# Patient Record
Sex: MALE | Race: WHITE | NOT HISPANIC OR LATINO | Employment: OTHER | URBAN - METROPOLITAN AREA
[De-identification: names, ages, dates, MRNs, and addresses within clinical notes are randomized per-mention and may not be internally consistent; named-entity substitution may affect disease eponyms.]

---

## 2019-03-24 ENCOUNTER — HOSPITAL ENCOUNTER (OUTPATIENT)
Dept: RADIOLOGY | Facility: MEDICAL CENTER | Age: 64
End: 2019-03-24

## 2019-03-24 ENCOUNTER — HOSPITAL ENCOUNTER (INPATIENT)
Facility: MEDICAL CENTER | Age: 64
LOS: 2 days | DRG: 247 | End: 2019-03-26
Attending: EMERGENCY MEDICINE | Admitting: INTERNAL MEDICINE
Payer: COMMERCIAL

## 2019-03-24 DIAGNOSIS — I21.4 NSTEMI (NON-ST ELEVATED MYOCARDIAL INFARCTION) (HCC): ICD-10-CM

## 2019-03-24 DIAGNOSIS — D72.823 LEUKEMOID REACTION: ICD-10-CM

## 2019-03-24 DIAGNOSIS — E87.6 HYPOKALEMIA: ICD-10-CM

## 2019-03-24 LAB
ALBUMIN SERPL BCP-MCNC: 4.1 G/DL (ref 3.2–4.9)
ALBUMIN/GLOB SERPL: 1.4 G/DL
ALP SERPL-CCNC: 51 U/L (ref 30–99)
ALT SERPL-CCNC: 16 U/L (ref 2–50)
ANION GAP SERPL CALC-SCNC: 7 MMOL/L (ref 0–11.9)
APTT PPP: 29.6 SEC (ref 24.7–36)
AST SERPL-CCNC: 23 U/L (ref 12–45)
BILIRUB SERPL-MCNC: 0.4 MG/DL (ref 0.1–1.5)
BUN SERPL-MCNC: 13 MG/DL (ref 8–22)
CALCIUM SERPL-MCNC: 9.1 MG/DL (ref 8.5–10.5)
CHLORIDE SERPL-SCNC: 110 MMOL/L (ref 96–112)
CO2 SERPL-SCNC: 24 MMOL/L (ref 20–33)
CREAT SERPL-MCNC: 0.85 MG/DL (ref 0.5–1.4)
EKG IMPRESSION: NORMAL
GLOBULIN SER CALC-MCNC: 3 G/DL (ref 1.9–3.5)
GLUCOSE SERPL-MCNC: 92 MG/DL (ref 65–99)
INR PPP: 1.07 (ref 0.87–1.13)
POTASSIUM SERPL-SCNC: 3.8 MMOL/L (ref 3.6–5.5)
PROT SERPL-MCNC: 7.1 G/DL (ref 6–8.2)
PROTHROMBIN TIME: 14 SEC (ref 12–14.6)
SODIUM SERPL-SCNC: 141 MMOL/L (ref 135–145)
TROPONIN I SERPL-MCNC: 4.17 NG/ML (ref 0–0.04)

## 2019-03-24 PROCEDURE — 85610 PROTHROMBIN TIME: CPT

## 2019-03-24 PROCEDURE — 99223 1ST HOSP IP/OBS HIGH 75: CPT | Performed by: INTERNAL MEDICINE

## 2019-03-24 PROCEDURE — 96375 TX/PRO/DX INJ NEW DRUG ADDON: CPT

## 2019-03-24 PROCEDURE — 93005 ELECTROCARDIOGRAM TRACING: CPT | Performed by: EMERGENCY MEDICINE

## 2019-03-24 PROCEDURE — 96366 THER/PROPH/DIAG IV INF ADDON: CPT

## 2019-03-24 PROCEDURE — 96365 THER/PROPH/DIAG IV INF INIT: CPT

## 2019-03-24 PROCEDURE — 85730 THROMBOPLASTIN TIME PARTIAL: CPT

## 2019-03-24 PROCEDURE — A9270 NON-COVERED ITEM OR SERVICE: HCPCS | Performed by: INTERNAL MEDICINE

## 2019-03-24 PROCEDURE — 84484 ASSAY OF TROPONIN QUANT: CPT

## 2019-03-24 PROCEDURE — 700102 HCHG RX REV CODE 250 W/ 637 OVERRIDE(OP): Performed by: INTERNAL MEDICINE

## 2019-03-24 PROCEDURE — 770020 HCHG ROOM/CARE - TELE (206)

## 2019-03-24 PROCEDURE — 700111 HCHG RX REV CODE 636 W/ 250 OVERRIDE (IP): Performed by: EMERGENCY MEDICINE

## 2019-03-24 PROCEDURE — 80053 COMPREHEN METABOLIC PANEL: CPT

## 2019-03-24 PROCEDURE — 99285 EMERGENCY DEPT VISIT HI MDM: CPT

## 2019-03-24 PROCEDURE — 770006 HCHG ROOM/CARE - MED/SURG/GYN SEMI*

## 2019-03-24 RX ORDER — ACETAMINOPHEN 325 MG/1
650 TABLET ORAL EVERY 6 HOURS PRN
Status: DISCONTINUED | OUTPATIENT
Start: 2019-03-24 | End: 2019-03-26 | Stop reason: HOSPADM

## 2019-03-24 RX ORDER — POLYETHYLENE GLYCOL 3350 17 G/17G
1 POWDER, FOR SOLUTION ORAL
Status: DISCONTINUED | OUTPATIENT
Start: 2019-03-24 | End: 2019-03-26 | Stop reason: HOSPADM

## 2019-03-24 RX ORDER — BISACODYL 10 MG
10 SUPPOSITORY, RECTAL RECTAL
Status: DISCONTINUED | OUTPATIENT
Start: 2019-03-24 | End: 2019-03-26 | Stop reason: HOSPADM

## 2019-03-24 RX ORDER — MORPHINE SULFATE 4 MG/ML
2-4 INJECTION, SOLUTION INTRAMUSCULAR; INTRAVENOUS
Status: DISCONTINUED | OUTPATIENT
Start: 2019-03-24 | End: 2019-03-26 | Stop reason: HOSPADM

## 2019-03-24 RX ORDER — HEPARIN SODIUM 1000 [USP'U]/ML
6000 INJECTION, SOLUTION INTRAVENOUS; SUBCUTANEOUS ONCE
Status: COMPLETED | OUTPATIENT
Start: 2019-03-24 | End: 2019-03-24

## 2019-03-24 RX ORDER — BUPIVACAINE HYDROCHLORIDE AND EPINEPHRINE 5; 5 MG/ML; UG/ML
10 INJECTION, SOLUTION EPIDURAL; INTRACAUDAL; PERINEURAL ONCE
Status: DISCONTINUED | OUTPATIENT
Start: 2019-03-24 | End: 2019-03-24 | Stop reason: CLARIF

## 2019-03-24 RX ORDER — HEPARIN SODIUM 1000 [USP'U]/ML
3200 INJECTION, SOLUTION INTRAVENOUS; SUBCUTANEOUS PRN
Status: DISCONTINUED | OUTPATIENT
Start: 2019-03-24 | End: 2019-03-25

## 2019-03-24 RX ORDER — AMOXICILLIN 250 MG
2 CAPSULE ORAL 2 TIMES DAILY
Status: DISCONTINUED | OUTPATIENT
Start: 2019-03-24 | End: 2019-03-26 | Stop reason: HOSPADM

## 2019-03-24 RX ORDER — ONDANSETRON 4 MG/1
4 TABLET, ORALLY DISINTEGRATING ORAL EVERY 4 HOURS PRN
Status: DISCONTINUED | OUTPATIENT
Start: 2019-03-24 | End: 2019-03-26 | Stop reason: HOSPADM

## 2019-03-24 RX ORDER — CEFAZOLIN SODIUM 2 G/100ML
2 INJECTION, SOLUTION INTRAVENOUS ONCE
Status: DISCONTINUED | OUTPATIENT
Start: 2019-03-24 | End: 2019-03-24 | Stop reason: CLARIF

## 2019-03-24 RX ORDER — CEFAZOLIN SODIUM 2 G/100ML
2 INJECTION, SOLUTION INTRAVENOUS EVERY 8 HOURS
Status: DISCONTINUED | OUTPATIENT
Start: 2019-03-24 | End: 2019-03-24

## 2019-03-24 RX ORDER — NITROGLYCERIN 0.4 MG/1
0.4 TABLET SUBLINGUAL
Status: DISCONTINUED | OUTPATIENT
Start: 2019-03-24 | End: 2019-03-26 | Stop reason: HOSPADM

## 2019-03-24 RX ORDER — HEPARIN SODIUM 1000 [USP'U]/ML
3200 INJECTION, SOLUTION INTRAVENOUS; SUBCUTANEOUS PRN
Status: DISCONTINUED | OUTPATIENT
Start: 2019-03-24 | End: 2019-03-24

## 2019-03-24 RX ORDER — ATORVASTATIN CALCIUM 80 MG/1
80 TABLET, FILM COATED ORAL EVERY EVENING
Status: DISCONTINUED | OUTPATIENT
Start: 2019-03-24 | End: 2019-03-26 | Stop reason: HOSPADM

## 2019-03-24 RX ORDER — ONDANSETRON 2 MG/ML
4 INJECTION INTRAMUSCULAR; INTRAVENOUS EVERY 4 HOURS PRN
Status: DISCONTINUED | OUTPATIENT
Start: 2019-03-24 | End: 2019-03-26 | Stop reason: HOSPADM

## 2019-03-24 RX ADMIN — HEPARIN SODIUM 6000 UNITS: 1000 INJECTION INTRAVENOUS; SUBCUTANEOUS at 20:47

## 2019-03-24 RX ADMIN — ATORVASTATIN CALCIUM 80 MG: 80 TABLET, FILM COATED ORAL at 23:17

## 2019-03-24 RX ADMIN — HEPARIN SODIUM 1200 UNITS/HR: 5000 INJECTION, SOLUTION INTRAVENOUS at 20:47

## 2019-03-24 RX ADMIN — METOPROLOL TARTRATE 25 MG: 25 TABLET, FILM COATED ORAL at 23:17

## 2019-03-24 ASSESSMENT — COGNITIVE AND FUNCTIONAL STATUS - GENERAL
SUGGESTED CMS G CODE MODIFIER DAILY ACTIVITY: CH
SUGGESTED CMS G CODE MODIFIER MOBILITY: CH
DAILY ACTIVITIY SCORE: 24
MOBILITY SCORE: 24

## 2019-03-24 ASSESSMENT — LIFESTYLE VARIABLES
ALCOHOL_USE: NO
EVER_SMOKED: YES

## 2019-03-24 ASSESSMENT — PATIENT HEALTH QUESTIONNAIRE - PHQ9
SUM OF ALL RESPONSES TO PHQ9 QUESTIONS 1 AND 2: 0
2. FEELING DOWN, DEPRESSED, IRRITABLE, OR HOPELESS: NOT AT ALL
1. LITTLE INTEREST OR PLEASURE IN DOING THINGS: NOT AT ALL

## 2019-03-24 ASSESSMENT — PAIN SCALES - WONG BAKER: WONGBAKER_NUMERICALRESPONSE: DOESN'T HURT AT ALL

## 2019-03-25 ENCOUNTER — APPOINTMENT (OUTPATIENT)
Dept: CARDIOLOGY | Facility: MEDICAL CENTER | Age: 64
DRG: 247 | End: 2019-03-25
Attending: INTERNAL MEDICINE
Payer: COMMERCIAL

## 2019-03-25 ENCOUNTER — APPOINTMENT (OUTPATIENT)
Dept: CARDIOLOGY | Facility: MEDICAL CENTER | Age: 64
DRG: 247 | End: 2019-03-25
Attending: STUDENT IN AN ORGANIZED HEALTH CARE EDUCATION/TRAINING PROGRAM
Payer: COMMERCIAL

## 2019-03-25 PROBLEM — E87.6 HYPOKALEMIA: Status: ACTIVE | Noted: 2019-03-25

## 2019-03-25 PROBLEM — R73.03 BORDERLINE DIABETES MELLITUS: Status: ACTIVE | Noted: 2019-03-25

## 2019-03-25 PROBLEM — I21.4 NSTEMI (NON-ST ELEVATED MYOCARDIAL INFARCTION) (HCC): Status: ACTIVE | Noted: 2019-03-25

## 2019-03-25 PROBLEM — D72.829 LEUKOCYTOSIS: Status: ACTIVE | Noted: 2019-03-25

## 2019-03-25 LAB
ALBUMIN SERPL BCP-MCNC: 3.7 G/DL (ref 3.2–4.9)
ALBUMIN/GLOB SERPL: 1.2 G/DL
ALP SERPL-CCNC: 44 U/L (ref 30–99)
ALT SERPL-CCNC: 13 U/L (ref 2–50)
ANION GAP SERPL CALC-SCNC: 6 MMOL/L (ref 0–11.9)
ANION GAP SERPL CALC-SCNC: 9 MMOL/L (ref 0–11.9)
APTT PPP: 57.9 SEC (ref 24.7–36)
APTT PPP: 64 SEC (ref 24.7–36)
APTT PPP: 70.2 SEC (ref 24.7–36)
AST SERPL-CCNC: 25 U/L (ref 12–45)
BASOPHILS # BLD AUTO: 0.6 % (ref 0–1.8)
BASOPHILS # BLD: 0.09 K/UL (ref 0–0.12)
BILIRUB SERPL-MCNC: 0.8 MG/DL (ref 0.1–1.5)
BNP SERPL-MCNC: 281 PG/ML (ref 0–100)
BUN SERPL-MCNC: 12 MG/DL (ref 8–22)
BUN SERPL-MCNC: 13 MG/DL (ref 8–22)
CALCIUM SERPL-MCNC: 8.6 MG/DL (ref 8.5–10.5)
CALCIUM SERPL-MCNC: 8.6 MG/DL (ref 8.5–10.5)
CHLORIDE SERPL-SCNC: 109 MMOL/L (ref 96–112)
CHLORIDE SERPL-SCNC: 110 MMOL/L (ref 96–112)
CHOLEST SERPL-MCNC: 145 MG/DL (ref 100–199)
CO2 SERPL-SCNC: 22 MMOL/L (ref 20–33)
CO2 SERPL-SCNC: 23 MMOL/L (ref 20–33)
CREAT SERPL-MCNC: 0.71 MG/DL (ref 0.5–1.4)
CREAT SERPL-MCNC: 0.72 MG/DL (ref 0.5–1.4)
EKG IMPRESSION: NORMAL
EOSINOPHIL # BLD AUTO: 0.32 K/UL (ref 0–0.51)
EOSINOPHIL NFR BLD: 2.3 % (ref 0–6.9)
ERYTHROCYTE [DISTWIDTH] IN BLOOD BY AUTOMATED COUNT: 46 FL (ref 35.9–50)
ERYTHROCYTE [DISTWIDTH] IN BLOOD BY AUTOMATED COUNT: 47 FL (ref 35.9–50)
EST. AVERAGE GLUCOSE BLD GHB EST-MCNC: 126 MG/DL
GLOBULIN SER CALC-MCNC: 3.1 G/DL (ref 1.9–3.5)
GLUCOSE SERPL-MCNC: 111 MG/DL (ref 65–99)
GLUCOSE SERPL-MCNC: 112 MG/DL (ref 65–99)
HBA1C MFR BLD: 6 % (ref 0–5.6)
HCT VFR BLD AUTO: 46.8 % (ref 42–52)
HCT VFR BLD AUTO: 49 % (ref 42–52)
HDLC SERPL-MCNC: 31 MG/DL
HGB BLD-MCNC: 15.5 G/DL (ref 14–18)
HGB BLD-MCNC: 15.9 G/DL (ref 14–18)
IMM GRANULOCYTES # BLD AUTO: 0.05 K/UL (ref 0–0.11)
IMM GRANULOCYTES NFR BLD AUTO: 0.4 % (ref 0–0.9)
INR PPP: 1.15 (ref 0.87–1.13)
LDLC SERPL CALC-MCNC: 82 MG/DL
LV EJECT FRACT  99904: 55
LV EJECT FRACT MOD 2C 99903: 60.7
LV EJECT FRACT MOD 4C 99902: 54.7
LV EJECT FRACT MOD BP 99901: 58.82
LYMPHOCYTES # BLD AUTO: 3.72 K/UL (ref 1–4.8)
LYMPHOCYTES NFR BLD: 26.3 % (ref 22–41)
MAGNESIUM SERPL-MCNC: 1.8 MG/DL (ref 1.5–2.5)
MCH RBC QN AUTO: 28.9 PG (ref 27–33)
MCH RBC QN AUTO: 29.4 PG (ref 27–33)
MCHC RBC AUTO-ENTMCNC: 32.4 G/DL (ref 33.7–35.3)
MCHC RBC AUTO-ENTMCNC: 33.1 G/DL (ref 33.7–35.3)
MCV RBC AUTO: 88.8 FL (ref 81.4–97.8)
MCV RBC AUTO: 89.1 FL (ref 81.4–97.8)
MONOCYTES # BLD AUTO: 1.09 K/UL (ref 0–0.85)
MONOCYTES NFR BLD AUTO: 7.7 % (ref 0–13.4)
NEUTROPHILS # BLD AUTO: 8.88 K/UL (ref 1.82–7.42)
NEUTROPHILS NFR BLD: 62.7 % (ref 44–72)
NRBC # BLD AUTO: 0 K/UL
NRBC BLD-RTO: 0 /100 WBC
PLATELET # BLD AUTO: 207 K/UL (ref 164–446)
PLATELET # BLD AUTO: 219 K/UL (ref 164–446)
PMV BLD AUTO: 10.6 FL (ref 9–12.9)
PMV BLD AUTO: 11 FL (ref 9–12.9)
POTASSIUM SERPL-SCNC: 3.5 MMOL/L (ref 3.6–5.5)
POTASSIUM SERPL-SCNC: 4.1 MMOL/L (ref 3.6–5.5)
PROT SERPL-MCNC: 6.8 G/DL (ref 6–8.2)
PROTHROMBIN TIME: 14.8 SEC (ref 12–14.6)
RBC # BLD AUTO: 5.27 M/UL (ref 4.7–6.1)
RBC # BLD AUTO: 5.5 M/UL (ref 4.7–6.1)
SODIUM SERPL-SCNC: 138 MMOL/L (ref 135–145)
SODIUM SERPL-SCNC: 141 MMOL/L (ref 135–145)
TRIGL SERPL-MCNC: 159 MG/DL (ref 0–149)
TROPONIN I SERPL-MCNC: 3.76 NG/ML (ref 0–0.04)
TSH SERPL DL<=0.005 MIU/L-ACNC: 4.63 UIU/ML (ref 0.38–5.33)
WBC # BLD AUTO: 12.4 K/UL (ref 4.8–10.8)
WBC # BLD AUTO: 14.2 K/UL (ref 4.8–10.8)

## 2019-03-25 PROCEDURE — A9270 NON-COVERED ITEM OR SERVICE: HCPCS | Performed by: INTERNAL MEDICINE

## 2019-03-25 PROCEDURE — 85730 THROMBOPLASTIN TIME PARTIAL: CPT

## 2019-03-25 PROCEDURE — 93306 TTE W/DOPPLER COMPLETE: CPT

## 2019-03-25 PROCEDURE — 700117 HCHG RX CONTRAST REV CODE 255: Performed by: INTERNAL MEDICINE

## 2019-03-25 PROCEDURE — B2111ZZ FLUOROSCOPY OF MULTIPLE CORONARY ARTERIES USING LOW OSMOLAR CONTRAST: ICD-10-PCS | Performed by: INTERNAL MEDICINE

## 2019-03-25 PROCEDURE — 93010 ELECTROCARDIOGRAM REPORT: CPT | Performed by: INTERNAL MEDICINE

## 2019-03-25 PROCEDURE — 92928 PRQ TCAT PLMT NTRAC ST 1 LES: CPT | Mod: LD | Performed by: INTERNAL MEDICINE

## 2019-03-25 PROCEDURE — 99233 SBSQ HOSP IP/OBS HIGH 50: CPT | Mod: GC | Performed by: INTERNAL MEDICINE

## 2019-03-25 PROCEDURE — 770020 HCHG ROOM/CARE - TELE (206)

## 2019-03-25 PROCEDURE — 99152 MOD SED SAME PHYS/QHP 5/>YRS: CPT | Performed by: INTERNAL MEDICINE

## 2019-03-25 PROCEDURE — 85027 COMPLETE CBC AUTOMATED: CPT

## 2019-03-25 PROCEDURE — 83735 ASSAY OF MAGNESIUM: CPT

## 2019-03-25 PROCEDURE — 85610 PROTHROMBIN TIME: CPT

## 2019-03-25 PROCEDURE — 93306 TTE W/DOPPLER COMPLETE: CPT | Mod: 26 | Performed by: INTERNAL MEDICINE

## 2019-03-25 PROCEDURE — 99153 MOD SED SAME PHYS/QHP EA: CPT

## 2019-03-25 PROCEDURE — 99233 SBSQ HOSP IP/OBS HIGH 50: CPT | Performed by: HOSPITALIST

## 2019-03-25 PROCEDURE — 80061 LIPID PANEL: CPT

## 2019-03-25 PROCEDURE — 99255 IP/OBS CONSLTJ NEW/EST HI 80: CPT | Mod: 25 | Performed by: INTERNAL MEDICINE

## 2019-03-25 PROCEDURE — 85025 COMPLETE CBC W/AUTO DIFF WBC: CPT

## 2019-03-25 PROCEDURE — C9600 PERC DRUG-EL COR STENT SING: HCPCS | Mod: LD

## 2019-03-25 PROCEDURE — 36415 COLL VENOUS BLD VENIPUNCTURE: CPT

## 2019-03-25 PROCEDURE — 700102 HCHG RX REV CODE 250 W/ 637 OVERRIDE(OP): Performed by: STUDENT IN AN ORGANIZED HEALTH CARE EDUCATION/TRAINING PROGRAM

## 2019-03-25 PROCEDURE — 93458 L HRT ARTERY/VENTRICLE ANGIO: CPT | Mod: 26,59 | Performed by: INTERNAL MEDICINE

## 2019-03-25 PROCEDURE — 84484 ASSAY OF TROPONIN QUANT: CPT

## 2019-03-25 PROCEDURE — 700102 HCHG RX REV CODE 250 W/ 637 OVERRIDE(OP)

## 2019-03-25 PROCEDURE — 83880 ASSAY OF NATRIURETIC PEPTIDE: CPT

## 2019-03-25 PROCEDURE — A9270 NON-COVERED ITEM OR SERVICE: HCPCS | Performed by: STUDENT IN AN ORGANIZED HEALTH CARE EDUCATION/TRAINING PROGRAM

## 2019-03-25 PROCEDURE — 700101 HCHG RX REV CODE 250

## 2019-03-25 PROCEDURE — 80048 BASIC METABOLIC PNL TOTAL CA: CPT

## 2019-03-25 PROCEDURE — 027034Z DILATION OF CORONARY ARTERY, ONE ARTERY WITH DRUG-ELUTING INTRALUMINAL DEVICE, PERCUTANEOUS APPROACH: ICD-10-PCS | Performed by: INTERNAL MEDICINE

## 2019-03-25 PROCEDURE — 93005 ELECTROCARDIOGRAM TRACING: CPT | Performed by: INTERNAL MEDICINE

## 2019-03-25 PROCEDURE — B2151ZZ FLUOROSCOPY OF LEFT HEART USING LOW OSMOLAR CONTRAST: ICD-10-PCS | Performed by: INTERNAL MEDICINE

## 2019-03-25 PROCEDURE — 700102 HCHG RX REV CODE 250 W/ 637 OVERRIDE(OP): Performed by: INTERNAL MEDICINE

## 2019-03-25 PROCEDURE — 84443 ASSAY THYROID STIM HORMONE: CPT

## 2019-03-25 PROCEDURE — 700111 HCHG RX REV CODE 636 W/ 250 OVERRIDE (IP)

## 2019-03-25 PROCEDURE — 4A023N7 MEASUREMENT OF CARDIAC SAMPLING AND PRESSURE, LEFT HEART, PERCUTANEOUS APPROACH: ICD-10-PCS | Performed by: INTERNAL MEDICINE

## 2019-03-25 PROCEDURE — 80053 COMPREHEN METABOLIC PANEL: CPT

## 2019-03-25 PROCEDURE — 83036 HEMOGLOBIN GLYCOSYLATED A1C: CPT

## 2019-03-25 PROCEDURE — 94760 N-INVAS EAR/PLS OXIMETRY 1: CPT

## 2019-03-25 PROCEDURE — A9270 NON-COVERED ITEM OR SERVICE: HCPCS

## 2019-03-25 RX ORDER — POTASSIUM CHLORIDE 20 MEQ/1
40 TABLET, EXTENDED RELEASE ORAL ONCE
Status: COMPLETED | OUTPATIENT
Start: 2019-03-25 | End: 2019-03-25

## 2019-03-25 RX ORDER — ASPIRIN 81 MG/1
81 TABLET, CHEWABLE ORAL DAILY
Status: DISCONTINUED | OUTPATIENT
Start: 2019-03-26 | End: 2019-03-26 | Stop reason: HOSPADM

## 2019-03-25 RX ORDER — VERAPAMIL HYDROCHLORIDE 2.5 MG/ML
INJECTION, SOLUTION INTRAVENOUS
Status: COMPLETED
Start: 2019-03-25 | End: 2019-03-25

## 2019-03-25 RX ORDER — SODIUM CHLORIDE 9 MG/ML
INJECTION, SOLUTION INTRAVENOUS CONTINUOUS
Status: DISCONTINUED | OUTPATIENT
Start: 2019-03-25 | End: 2019-03-26 | Stop reason: HOSPADM

## 2019-03-25 RX ORDER — BIVALIRUDIN 250 MG/5ML
INJECTION, POWDER, LYOPHILIZED, FOR SOLUTION INTRAVENOUS
Status: COMPLETED
Start: 2019-03-25 | End: 2019-03-25

## 2019-03-25 RX ORDER — MIDAZOLAM HYDROCHLORIDE 1 MG/ML
INJECTION INTRAMUSCULAR; INTRAVENOUS
Status: COMPLETED
Start: 2019-03-25 | End: 2019-03-25

## 2019-03-25 RX ORDER — LIDOCAINE HYDROCHLORIDE 20 MG/ML
INJECTION, SOLUTION INFILTRATION; PERINEURAL
Status: COMPLETED
Start: 2019-03-25 | End: 2019-03-25

## 2019-03-25 RX ORDER — METOPROLOL TARTRATE 50 MG/1
50 TABLET, FILM COATED ORAL TWICE DAILY
Status: DISCONTINUED | OUTPATIENT
Start: 2019-03-25 | End: 2019-03-25

## 2019-03-25 RX ORDER — PRASUGREL 10 MG/1
TABLET, FILM COATED ORAL
Status: COMPLETED
Start: 2019-03-25 | End: 2019-03-25

## 2019-03-25 RX ORDER — HEPARIN SODIUM,PORCINE 1000/ML
VIAL (ML) INJECTION
Status: COMPLETED
Start: 2019-03-25 | End: 2019-03-25

## 2019-03-25 RX ADMIN — VERAPAMIL HYDROCHLORIDE 5 MG: 2.5 INJECTION, SOLUTION INTRAVENOUS at 17:52

## 2019-03-25 RX ADMIN — BIVALIRUDIN 250 MG: 250 INJECTION, POWDER, LYOPHILIZED, FOR SOLUTION INTRAVENOUS at 18:03

## 2019-03-25 RX ADMIN — FENTANYL CITRATE 100 MCG: 50 INJECTION INTRAMUSCULAR; INTRAVENOUS at 17:53

## 2019-03-25 RX ADMIN — PRASUGREL 60 MG: 10 TABLET, FILM COATED ORAL at 18:26

## 2019-03-25 RX ADMIN — METOPROLOL TARTRATE 25 MG: 25 TABLET, FILM COATED ORAL at 22:42

## 2019-03-25 RX ADMIN — ATORVASTATIN CALCIUM 80 MG: 80 TABLET, FILM COATED ORAL at 22:43

## 2019-03-25 RX ADMIN — IOHEXOL 161 ML: 350 INJECTION, SOLUTION INTRAVENOUS at 18:26

## 2019-03-25 RX ADMIN — BIVALIRUDIN 250 MG: 250 INJECTION, POWDER, LYOPHILIZED, FOR SOLUTION INTRAVENOUS at 18:39

## 2019-03-25 RX ADMIN — LIDOCAINE HYDROCHLORIDE: 20 INJECTION, SOLUTION INFILTRATION; PERINEURAL at 17:51

## 2019-03-25 RX ADMIN — NITROGLYCERIN 10 ML: 20 INJECTION INTRAVENOUS at 17:52

## 2019-03-25 RX ADMIN — MIDAZOLAM HYDROCHLORIDE 2 MG: 1 INJECTION, SOLUTION INTRAMUSCULAR; INTRAVENOUS at 17:53

## 2019-03-25 RX ADMIN — HEPARIN SODIUM 2000 UNITS: 1000 INJECTION, SOLUTION INTRAVENOUS; SUBCUTANEOUS at 17:52

## 2019-03-25 RX ADMIN — POTASSIUM CHLORIDE 40 MEQ: 1500 TABLET, EXTENDED RELEASE ORAL at 05:20

## 2019-03-25 RX ADMIN — ASPIRIN 81 MG: 81 TABLET, COATED ORAL at 05:21

## 2019-03-25 RX ADMIN — FENTANYL CITRATE 100 MCG: 50 INJECTION INTRAMUSCULAR; INTRAVENOUS at 18:14

## 2019-03-25 RX ADMIN — HEPARIN SODIUM: 1000 INJECTION INTRAVENOUS; SUBCUTANEOUS at 17:51

## 2019-03-25 ASSESSMENT — ENCOUNTER SYMPTOMS
CLAUDICATION: 0
DEPRESSION: 0
ABDOMINAL PAIN: 0
NAUSEA: 0
DIARRHEA: 0
EYE DISCHARGE: 0
BRUISES/BLEEDS EASILY: 0
FEVER: 0
CONSTIPATION: 0
WEAKNESS: 0
COUGH: 0
MYALGIAS: 0
SPEECH CHANGE: 0
DIZZINESS: 0
FOCAL WEAKNESS: 0
HEMOPTYSIS: 0
VOMITING: 0
CHILLS: 0
WHEEZING: 0
DIAPHORESIS: 0
FLANK PAIN: 0
HEADACHES: 0
SENSORY CHANGE: 0
SHORTNESS OF BREATH: 1
BLOOD IN STOOL: 0
EYE PAIN: 0
LOSS OF CONSCIOUSNESS: 0
SPUTUM PRODUCTION: 0
SORE THROAT: 0
PALPITATIONS: 0
SEIZURES: 0
BLURRED VISION: 0
BACK PAIN: 0
SHORTNESS OF BREATH: 0
NECK PAIN: 0

## 2019-03-25 ASSESSMENT — LIFESTYLE VARIABLES
EVER_SMOKED: YES
SUBSTANCE_ABUSE: 0

## 2019-03-25 ASSESSMENT — COPD QUESTIONNAIRES
HAVE YOU SMOKED AT LEAST 100 CIGARETTES IN YOUR ENTIRE LIFE: YES
DO YOU EVER COUGH UP ANY MUCUS OR PHLEGM?: NO/ONLY WITH OCCASIONAL COLDS OR INFECTIONS
COPD SCREENING SCORE: 4
DURING THE PAST 4 WEEKS HOW MUCH DID YOU FEEL SHORT OF BREATH: NONE/LITTLE OF THE TIME

## 2019-03-25 NOTE — PROGRESS NOTES
Report received. Pt care assumed. Assessment performed. Pt AOx4. Pt laying supine in bed. Pt denies pain and no signs of distress. Bed in low, locked position. Bed alarm on. Call light within reach. Treaded socks on pt.  Hourly rounding in place.

## 2019-03-25 NOTE — PROGRESS NOTES
Patient to have angiogram tomorrow per cardiology consult this morning. No echocardiogram results posted yet.    At this time, NSTEMI diagnosis is given by hospital medicine, emergency medicine and cardiology.     Below are the ACS measures that will need to be met should patient continue with an ACS diagnosis post cath.    No one is diagnosing HF at this time.    ACS Measures:  1. ASA prescribed at discharge  2. Beta blockade prescribed at discharge, if patient also has HFrEF (EF less than or equal to 40%), this needs to be one of the three evidence based beta blockers: carvedilol, bisoprolol, Toprol XL  3. High intensity statin prescribed at discharge (atorvastatin 40 mg or rosuvastatin 20 mg)  4. ACE-I or ARB prescribed on discharge for LVEF less than 40%  5. Aldosterone blockade prescribed for patients with EF less than 40% AND history of diabetes mellitus OR history of heart failure, heart failure on presentation or heart failure as an in-hospital event  6. ICR referral order is placed  7. Use the Acute Coronary Syndrome discharge instructions to document that patient has been provided with the contact information for ICR   8. Evaluation of LV systolic function can be by angiogram, or echo before discharge, or within past year, cannot be a future plan for LVSF assessment  9. ACS education is documented daily  1. For anyone who has had PCI, initiate Meds to Beds: Monday through Friday 9-5 call [phone]. All other times, call [phone] and ask to page the on-call [facility] pharmacist.  10. Smoking cessation counseling    What if any of the above ACS Measures are contraindicated?  ? Request that the discharging provider document the medication/intervention and the contraindication specifically in a progress note  ? For example: “no ACE-I meds due to hypotension” is not enough. It needs to say: “No ACE-I, ARNI, ARB due to hypotension”; “No Beta Blockade due to bradycardia”…       Jennifer SANTORO RN, [employer] ext. [phone] M-F

## 2019-03-25 NOTE — ED NOTES
Med rec completed per pt.   Antibiotics within last 30 days: No  Patient allergies have been reviewed: AYLA  Comments: Pt denies taking medications

## 2019-03-25 NOTE — PROGRESS NOTES
Hospital Medicine Daily Progress Note    Date of Service  3/25/2019    Chief Complaint  63 y.o. male admitted 3/24/2019 with chest pain.    Hospital Course    3/25:  This 62yo male with no prior cardiac disease, diet controlled prediabetes presented with significant chest pressure, diaphoresis and SOB while carrying ski equipment to car after day of skiing.  He was seen at OSH and transferred to [facility] where troponin max to 4.17.  Cardiology consulted, heparin drip, plan for cath lab today, NPO.  a1c 6%, BP low on exam 96/52 s/p metoprolol 25 bid.  NSR on monitor.  No further chest pain s/p admission.*          Consultants/Specialty   To     Code Status  full    Disposition  Likely home to family, no needs.    Review of Systems  Review of Systems   Constitutional: Negative for chills, diaphoresis, fever and malaise/fatigue.   HENT: Negative for congestion and sore throat.    Eyes: Negative for pain and discharge.   Respiratory: Negative for cough, hemoptysis, sputum production, shortness of breath and wheezing.    Cardiovascular: Positive for chest pain. Negative for palpitations, claudication and leg swelling.   Gastrointestinal: Negative for abdominal pain, constipation, diarrhea, melena, nausea and vomiting.   Genitourinary: Negative for dysuria, frequency and urgency.   Musculoskeletal: Negative for back pain, joint pain, myalgias and neck pain.   Skin: Negative for itching and rash.   Neurological: Negative for dizziness, sensory change, speech change, focal weakness, loss of consciousness, weakness and headaches.   Endo/Heme/Allergies: Does not bruise/bleed easily.   Psychiatric/Behavioral: Negative for depression, substance abuse and suicidal ideas.        Physical Exam  Temp:  [36.6 °C (97.9 °F)-37.1 °C (98.7 °F)] 36.8 °C (98.2 °F)  Pulse:  [56-69] 56  Resp:  [16-17] 17  BP: ()/(52-98) 116/71  SpO2:  [92 %-96 %] 95 %    Physical Exam   Constitutional: He is oriented to person, place, and time. He  appears well-developed and well-nourished. No distress.   HENT:   Head: Normocephalic and atraumatic.   Mouth/Throat: Oropharynx is clear and moist. No oropharyngeal exudate.   Eyes: Pupils are equal, round, and reactive to light. Conjunctivae and EOM are normal. Right eye exhibits no discharge. Left eye exhibits no discharge. No scleral icterus.   Neck: Normal range of motion. Neck supple. No JVD present. No tracheal deviation present. No thyromegaly present.   Cardiovascular: Normal rate, regular rhythm and normal heart sounds.  Exam reveals no gallop and no friction rub.    No murmur heard.  Pulmonary/Chest: Effort normal and breath sounds normal. No respiratory distress. He has no wheezes. He has no rales. He exhibits no tenderness.   Abdominal: Soft. Bowel sounds are normal. He exhibits no distension and no mass. There is no tenderness. There is no rebound and no guarding.   Musculoskeletal: Normal range of motion. He exhibits no edema or tenderness.   Lymphadenopathy:     He has no cervical adenopathy.   Neurological: He is alert and oriented to person, place, and time. No cranial nerve deficit. He exhibits normal muscle tone.   Skin: Skin is warm and dry. No rash noted. He is not diaphoretic. No erythema.   Psychiatric: He has a normal mood and affect. His behavior is normal. Judgment and thought content normal.   Nursing note and vitals reviewed.      Fluids    Intake/Output Summary (Last 24 hours) at 03/25/19 1826  Last data filed at 03/25/19 1200   Gross per 24 hour   Intake                0 ml   Output              300 ml   Net             -300 ml       Laboratory  Recent Labs      03/25/19 0225 03/25/19   0832   WBC  14.2*  12.4*   RBC  5.27  5.50   HEMOGLOBIN  15.5  15.9   HEMATOCRIT  46.8  49.0   MCV  88.8  89.1   MCH  29.4  28.9   MCHC  33.1*  32.4*   RDW  46.0  47.0   PLATELETCT  219  207   MPV  11.0  10.6     Recent Labs      03/24/19   1855  03/25/19   0225  03/25/19   0832   SODIUM  141  141   138   POTASSIUM  3.8  3.5*  4.1   CHLORIDE  110  109  110   CO2  24  23  22   GLUCOSE  92  112*  111*   BUN  13  12  13   CREATININE  0.85  0.72  0.71   CALCIUM  9.1  8.6  8.6     Recent Labs      03/24/19   1855  03/25/19   0225  03/25/19   0832  03/25/19   1443   APTT  29.6  70.2*  64.0*  57.9*   INR  1.07   --   1.15*   --      Recent Labs      03/25/19 0225   BNPBTYPENAT  281*     Recent Labs      03/25/19 0225   TRIGLYCERIDE  159*   HDL  31*   LDL  82       Imaging  EC-ECHOCARDIOGRAM COMPLETE W/O CONT   Final Result      OUTSIDE IMAGES-DX CHEST   Final Result      CL-LEFT HEART CATHETERIZATION WITH POSSIBLE INTERVENTION    (Results Pending)        Assessment/Plan  NSTEMI (non-ST elevated myocardial infarction) (HCC)- (present on admission)   Assessment & Plan    Patient will be admitted to the telemetry unit with close cardiac monitoring, serial EKG and troponin  Patient has received a full dose of aspirin and is started on IV heparin, monitor APTT  continue metoprolol 25 mg twice daily and atorvastatin 80 mg daily  Check 2D echo, lipid panel, TSH and hemoglobin A1c 6%  Nitro and morphine when necessary for chest pain  Cardiology has been consulted  Cath lab pending 3/25.  Troponin max 4.71.       Borderline diabetes mellitus- (present on admission)   Assessment & Plan    Hga1c 6%.  Start diabetic diet  Diabetic education ordered since having significant CAD.       Leukocytosis- (present on admission)   Assessment & Plan    Likely reactive  Monitor CBC and vitals     Hypokalemia- (present on admission)   Assessment & Plan    Replete with K Dur 40  Monitor BMP          VTE prophylaxis: heparin drip

## 2019-03-25 NOTE — CONSULTS
Cardiology Consult Note:    Rox To  Date & Time note created:    3/25/2019   7:16 AM     Referring MD:  Dr. Tubbs    Patient ID:   Name:             Niall Lane   YOB: 1955  Age:                 63 y.o.  male   MRN:               [id number]                                                             Chief Complaint / Reason for consult:  Chest pain, NSTEMI    History of Present Illness:    This is a 63-year-old male with no prior cardiac problems, procedure, presented to the hospital with chest pain.  Onset started after he skied yesterday morning, pressure-like sensation, anterior chest, radiated to his left arm.  Patient was found to have elevated troponin as well.  Troponin peaked at 4.2.    I personally interpreted his EKG tracing as well which shows sinus rhythm with mild ST segment changes.  Patient is not having any active chest pain at this time.  He is sleeping.    Family History   Problem Relation Age of Onset   • Heart Disease Mother          Review of Systems:      Constitutional: Denies fevers, Denies weight changes  Eyes: Denies changes in vision, no eye pain  Ears/Nose/Throat/Mouth: Denies nasal congestion or sore throat   Cardiovascular: no chest pain, no palpitations   Respiratory: no shortness of breath , Denies cough  Gastrointestinal/Hepatic: Denies abdominal pain, nausea, vomiting, diarrhea, constipation or GI bleeding   Genitourinary: Denies dysuria or frequency  Musculoskeletal/Rheum: Denies  joint pain and swelling   Skin: Denies rash  Neurological: Denies headache, confusion, memory loss or focal weakness/parasthesias  Psychiatric: denies mood disorder   Endocrine: Ivy thyroid problems  Heme/Oncology/Lymph Nodes: Denies enlarged lymph nodes, denies brusing or known bleeding disorder  All other systems were reviewed and are negative (AMA/CMS criteria)                Past Medical History:   Past Medical History:   Diagnosis Date   • Fracture      Active  Hospital Problems    Diagnosis   • NSTEMI (non-ST elevated myocardial infarction) ([facility]) [I21.4]     Priority: High   • Hypokalemia [E87.6]   • Leukocytosis [D72.829]       Past Surgical History:  History reviewed. No pertinent surgical history.    Hospital Medications:    Current Facility-Administered Medications:   •  metoprolol (LOPRESSOR) tablet 50 mg, 50 mg, Oral, TWICE DAILY, Rox Valencia M.D.  •  lidocaine (XYLOCAINE) 1 % injection 0.5 mL, 0.5 mL, Intradermal, Once PRN, Rox Valencia M.D.  •  NS infusion, , Intravenous, Continuous, Rox Valencia M.D.  •  senna-docusate (PERICOLACE or SENOKOT S) 8.6-50 MG per tablet 2 Tab, 2 Tab, Oral, BID **AND** polyethylene glycol/lytes (MIRALAX) PACKET 1 Packet, 1 Packet, Oral, QDAY PRN **AND** magnesium hydroxide (MILK OF MAGNESIA) suspension 30 mL, 30 mL, Oral, QDAY PRN **AND** bisacodyl (DULCOLAX) suppository 10 mg, 10 mg, Rectal, QDAY PRN, Alonzo Vieira M.D.  •  nitroglycerin (NITROSTAT) tablet 0.4 mg, 0.4 mg, Sublingual, Q5 MIN PRN, Alonzo Vieira M.D.  •  morphine (pf) 4 mg/ml injection 2-4 mg, 2-4 mg, Intravenous, Q3HRS PRN, Alonzo Vieira M.D.  •  atorvastatin (LIPITOR) tablet 80 mg, 80 mg, Oral, Q EVENING, Alonzo Vieira M.D., 80 mg at 03/24/19 2317  •  aspirin EC (ECOTRIN) tablet 81 mg, 81 mg, Oral, DAILY, Alonzo Vieira M.D., 81 mg at 03/25/19 0521  •  Respiratory Care per Protocol, , Nebulization, Continuous RT, Alonzo Vieira M.D.  •  acetaminophen (TYLENOL) tablet 650 mg, 650 mg, Oral, Q6HRS PRN, Alonzo Vieira M.D.  •  ondansetron (ZOFRAN) syringe/vial injection 4 mg, 4 mg, Intravenous, Q4HRS PRN, Alonzo Vieira M.D.  •  ondansetron (ZOFRAN ODT) dispertab 4 mg, 4 mg, Oral, Q4HRS PRN, Alonzo Vieira M.D.  •  [COMPLETED] heparin injection 6,000 Units, 6,000 Units, Intravenous, Once, 6,000 Units at 03/24/19 2047 **AND** heparin injection 3,200 Units, 3,200 Units, Intravenous, PRN **AND** heparin infusion 25,000 units in 500 ml 0.45% nacl, , Intravenous,  Continuous, Last Rate: 24 mL/hr at 03/25/19 0346, 1,200 Units/hr at 03/25/19 0346 **AND** Protocol 440 Heparin Weight Based DO NOT GIVE ANY HEPARIN BOLUS TO STROKE PATIENT, , , CONTINUOUS **AND** Protocol 440 Heparin Weight Based Discontinue Enoxaparin (Lovenox), Dabigatran (Pradaxa), Rivaroxaban (Xarelto), Apixaban (Eliquis), Edoxaban (Savaysa, Lixiana), Fondaparinux (Arixtra) and Argatroban prior to heparin administration, , , CONTINUOUS **AND** Protocol 440 Heparin Weight Based Draw baseline aPTT, PT, and platelet count if not already done, , , CONTINUOUS **AND** Protocol 440 Heparin Weight Based Draw aPTT 6 hours after beginning infusion. , , , CONTINUOUS **AND** Protocol 440 Heparin Weight Based Draw Platelet count every three days. Contact MD if platelet is 50% lower than baseline count., , , CONTINUOUS **AND** Protocol 440 Heparin Weight Based Record Patient Data, , , CONTINUOUS **AND** Protocol 440 Heparin Weight Based INSTRUCTIONS, , , CONTINUOUS **AND** Protocol 440 Heparin Weight Based Review aPTT results 6 hours after infusion is begun as detailed, , , CONTINUOUS **AND** Protocol 440 Heparin Weight Based Adjust heparin to maintain aPTT between 55-96 sec, , , CONTINUOUS **AND** Protocol 440 Heparin Weight Based Order aPTT 6 hours after any rate change or hold until aPTT is therapeutic (55-96 seconds), , , CONTINUOUS **AND** Protocol 440 Heparin Weight Based Documentation and verification, , , CONTINUOUS, Alonzo Vieira M.D.    Current Outpatient Medications:  No prescriptions prior to admission.       Medication Allergy:  No Known Allergies    Family History:  Family History   Problem Relation Age of Onset   • Heart Disease Mother        Social History:  Social History     Social History   • Marital status:      Spouse name: N/A   • Number of children: N/A   • Years of education: N/A     Occupational History   • Not on file.     Social History Main Topics   • Smoking status: Light Tobacco Smoker      Types: Cigars   • Smokeless tobacco: Never Used   • Alcohol use No   • Drug use: Yes     Types: Inhaled      Comment: marijuana   • Sexual activity: Not on file     Other Topics Concern   • Not on file     Social History Narrative   • No narrative on file         Physical Exam:  Vitals/ General Appearance:   Weight/BMI: Body mass index is 31.07 kg/m².  Blood pressure (!) 97/52, pulse (!) 59, temperature 37.1 °C (98.7 °F), temperature source Temporal, resp. rate 17, height 1.829 m (6'), weight 103.9 kg (229 lb 0.9 oz), SpO2 92 %.  Vitals:    03/24/19 2245 03/24/19 2300 03/25/19 0004 03/25/19 0420   BP: 134/72 (!) 163/98  (!) 97/52   Pulse:  69 65 (!) 59   Resp:  17 16 17   Temp:  36.7 °C (98 °F)  37.1 °C (98.7 °F)   TempSrc:  Temporal  Temporal   SpO2:  94% 96% 92%   Weight:  103.9 kg (229 lb 0.9 oz)     Height:         Oxygen Therapy:  Pulse Oximetry: 92 %, O2 (LPM): 0, FiO2%: 21 %, O2 Delivery: None (Room Air)    Constitutional:   Well developed, Well nourished, No acute distress  HENMT:  Normocephalic, Atraumatic, Oropharynx moist mucous membranes, No oral exudates, Nose normal.  No thyromegaly.  Eyes:  EOMI, Conjunctiva normal, No discharge.  Neck:  Normal range of motion, No cervical tenderness,  no JVD.  Cardiovascular:  Normal heart rate, Normal rhythm, No murmurs, No rubs, No gallops.   Extremitites with intact distal pulses, no cyanosis, or edema.  Lungs:  Normal breath sounds, breath sounds clear to auscultation bilaterally,  no rales, no rhonchi, no wheezing.   Abdomen: Bowel sounds normal, Soft, No tenderness, No guarding, No rebound, No masses, No hepatosplenomegaly.  Skin: Warm, Dry, No erythema, No rash, no induration.  Neurologic: Alert & oriented x 3, No focal deficits noted, cranial nerves II through X are intact.  Psychiatric: Affect normal, Judgment normal, Mood normal.      MDM (Data Review):     Records reviewed and summarized in current documentation    Lab Data Review:  Recent Results (from  the past 24 hour(s))   TROPONIN    Collection Time: 19  6:55 PM   Result Value Ref Range    Troponin I 4.17 (H) 0.00 - 0.04 ng/mL   Prothrombin Time    Collection Time: 19  6:55 PM   Result Value Ref Range    PT 14.0 12.0 - 14.6 sec    INR 1.07 0.87 - 1.13   APTT    Collection Time: 19  6:55 PM   Result Value Ref Range    APTT 29.6 24.7 - 36.0 sec   Comp Metabolic Panel    Collection Time: 19  6:55 PM   Result Value Ref Range    Sodium 141 135 - 145 mmol/L    Potassium 3.8 3.6 - 5.5 mmol/L    Chloride 110 96 - 112 mmol/L    Co2 24 20 - 33 mmol/L    Anion Gap 7.0 0.0 - 11.9    Glucose 92 65 - 99 mg/dL    Bun 13 8 - 22 mg/dL    Creatinine 0.85 0.50 - 1.40 mg/dL    Calcium 9.1 8.5 - 10.5 mg/dL    AST(SGOT) 23 12 - 45 U/L    ALT(SGPT) 16 2 - 50 U/L    Alkaline Phosphatase 51 30 - 99 U/L    Total Bilirubin 0.4 0.1 - 1.5 mg/dL    Albumin 4.1 3.2 - 4.9 g/dL    Total Protein 7.1 6.0 - 8.2 g/dL    Globulin 3.0 1.9 - 3.5 g/dL    A-G Ratio 1.4 g/dL   ESTIMATED GFR    Collection Time: 19  6:55 PM   Result Value Ref Range    GFR If African American >60 >60 mL/min/1.73 m 2    GFR If Non African American >60 >60 mL/min/1.73 m 2   EKG    Collection Time: 19  7:13 PM   Result Value Ref Range    Report       [facility] Emergency Dept.    Test Date:  2019  Pt Name:    DIANE WICK              Department: ER  MRN:        [id number]                      Room:        07  Gender:     Male                         Technician: [id number]  :        1955                   Requested By:SAMANTHA RICE  Order #:    [id number]                    Reading MD:    Measurements  Intervals                                Axis  Rate:       68                           P:          -10  MN:         144                          QRS:        55  QRSD:       98                           T:          4  QT:         380  QTc:        405    Interpretive Statements  SINUS RHYTHM  No previous ECG  available for comparison     Basic Metabolic Panel (BMP)    Collection Time: 03/25/19  2:25 AM   Result Value Ref Range    Sodium 141 135 - 145 mmol/L    Potassium 3.5 (L) 3.6 - 5.5 mmol/L    Chloride 109 96 - 112 mmol/L    Co2 23 20 - 33 mmol/L    Glucose 112 (H) 65 - 99 mg/dL    Bun 12 8 - 22 mg/dL    Creatinine 0.72 0.50 - 1.40 mg/dL    Calcium 8.6 8.5 - 10.5 mg/dL    Anion Gap 9.0 0.0 - 11.9   BType Natriuretic Peptide (BNP)    Collection Time: 03/25/19  2:25 AM   Result Value Ref Range    B Natriuretic Peptide 281 (H) 0 - 100 pg/mL   CBC with Differential    Collection Time: 03/25/19  2:25 AM   Result Value Ref Range    WBC 14.2 (H) 4.8 - 10.8 K/uL    RBC 5.27 4.70 - 6.10 M/uL    Hemoglobin 15.5 14.0 - 18.0 g/dL    Hematocrit 46.8 42.0 - 52.0 %    MCV 88.8 81.4 - 97.8 fL    MCH 29.4 27.0 - 33.0 pg    MCHC 33.1 (L) 33.7 - 35.3 g/dL    RDW 46.0 35.9 - 50.0 fL    Platelet Count 219 164 - 446 K/uL    MPV 11.0 9.0 - 12.9 fL    Neutrophils-Polys 62.70 44.00 - 72.00 %    Lymphocytes 26.30 22.00 - 41.00 %    Monocytes 7.70 0.00 - 13.40 %    Eosinophils 2.30 0.00 - 6.90 %    Basophils 0.60 0.00 - 1.80 %    Immature Granulocytes 0.40 0.00 - 0.90 %    Nucleated RBC 0.00 /100 WBC    Neutrophils (Absolute) 8.88 (H) 1.82 - 7.42 K/uL    Lymphs (Absolute) 3.72 1.00 - 4.80 K/uL    Monos (Absolute) 1.09 (H) 0.00 - 0.85 K/uL    Eos (Absolute) 0.32 0.00 - 0.51 K/uL    Baso (Absolute) 0.09 0.00 - 0.12 K/uL    Immature Granulocytes (abs) 0.05 0.00 - 0.11 K/uL    NRBC (Absolute) 0.00 K/uL   HEMOGLOBIN A1C    Collection Time: 03/25/19  2:25 AM   Result Value Ref Range    Glycohemoglobin 6.0 (H) 0.0 - 5.6 %    Est Avg Glucose 126 mg/dL   Lipid Profile (Tomorrow AM)    Collection Time: 03/25/19  2:25 AM   Result Value Ref Range    Cholesterol,Tot 145 100 - 199 mg/dL    Triglycerides 159 (H) 0 - 149 mg/dL    HDL 31 (A) >=40 mg/dL    LDL 82 <100 mg/dL   Troponin in four (4) hours    Collection Time: 03/25/19  2:25 AM   Result Value Ref  Range    Troponin I 3.76 (H) 0.00 - 0.04 ng/mL   TSH WITH REFLEX TO FT4    Collection Time: 19  2:25 AM   Result Value Ref Range    TSH 4.630 0.380 - 5.330 uIU/mL   APTT    Collection Time: 19  2:25 AM   Result Value Ref Range    APTT 70.2 (H) 24.7 - 36.0 sec   MAGNESIUM    Collection Time: 19  2:25 AM   Result Value Ref Range    Magnesium 1.8 1.5 - 2.5 mg/dL   ESTIMATED GFR    Collection Time: 19  2:25 AM   Result Value Ref Range    GFR If African American >60 >60 mL/min/1.73 m 2    GFR If Non African American >60 >60 mL/min/1.73 m 2   EKG in four (4) hours    Collection Time: 19  5:45 AM   Result Value Ref Range    Report       [facility]    Test Date:  2019  Pt Name:    DIANE WICK              Department: Replaced by [facility]  MRN:        [id number]                      Room:       23  Gender:     Male                         Technician: JO  :        1955                   Requested By:DIYA JO  Order #:    [id number]                    Reading MD:    Measurements  Intervals                                Axis  Rate:       59                           P:          64  CA:         150                          QRS:        75  QRSD:       101                          T:          47  QT:         427  QTc:        423    Interpretive Statements  SINUS BRADYCARDIA  BORDERLINE ST ELEVATION, LATERAL LEADS  Compared to ECG 2019 19:13:19  ST (T wave) deviation now present  Sinus rhythm no longer present         Imaging/Procedures Review:    Chest Xray:  Reviewed    EKG:   As in HPI.     MDM (Assessment and Plan):     Active Hospital Problems    Diagnosis   • NSTEMI (non-ST elevated myocardial infarction) (HCC) [I21.4]     Priority: High   • Hypokalemia [E87.6]   • Leukocytosis [D72.829]         Will cont heparin gtt with APTT goal from 60-80  Nitro gtt with titration to chest pain free  Metoprolol 25 mg po bid  ASA  Lipitor 80 mg po daily  Will check lipid profile  Will get  baseline TTE  Consider taking patient to cath lab if chest pain is not relieved with medicines or patient becomes hemodynamically unstable tonight.  Otherwise, will perform coronary angiogram tomorrow.    Thank you for referring this patient to our cardiology service.  We will follow patient with you.      Rox Valencia MD.   Cardiology Inpatient Service.  [facility].  [phone].  Ailyn Bradford.

## 2019-03-25 NOTE — ED TRIAGE NOTES
Pt bib ems transfer from [facility] for chest pain elevated troponin. Pt arrives currently no chest pain. Vss. nad

## 2019-03-25 NOTE — PROGRESS NOTES
[facility] Cardiology Follow-up Consult Note    Date of note:    3/25/2019      Consulting Physician: Lexii Tubbs M.D.      Chief Complaint   Patient presents with   • Chest Pain       Interim Events:  - Admitted overnight for NSTEMI, no recurrent chest pain  - On heparin drip  - ECHO : EF 55% Normal left ventricular systolic function.  There is apical anterior, apical septal apical inferior, and apical   akinesis.Mid anterior and mid anteroseptal hypokinesis.  Normal diastolic function.The right ventricle was normal in size and function.  Mild mitral regurgitation.  Moderate tricuspid regurgitation.  - Cardiac Cath Today.On clear liquid diet now.       ROS  No no recurrent chest pain  No NV, No Bleeding, No dizziness   All other review of systems reviewed and negative.    Past medical, surgical, social, and family history reviewed and unchanged from admission except as noted in assessment and plan.    Medications: Reviewed in MAR  Current Facility-Administered Medications   Medication Dose Frequency Provider Last Rate Last Dose   • lidocaine (XYLOCAINE) 1 % injection 0.5 mL  0.5 mL Once PRN Rox Valencia M.D.       • NS infusion   Continuous Rox Valencia M.D.       • metoprolol (LOPRESSOR) tablet 25 mg  25 mg TWICE DAILY Rich Castillo M.D.       • senna-docusate (PERICOLACE or SENOKOT S) 8.6-50 MG per tablet 2 Tab  2 Tab BID Alonzo Vieira M.D.        And   • polyethylene glycol/lytes (MIRALAX) PACKET 1 Packet  1 Packet QDAY PRN Alonzo Vieira M.D.        And   • magnesium hydroxide (MILK OF MAGNESIA) suspension 30 mL  30 mL QDAY PRN Alonzo Vieira M.D.        And   • bisacodyl (DULCOLAX) suppository 10 mg  10 mg QDAY PRN Alonzo Vieira M.D.       • nitroglycerin (NITROSTAT) tablet 0.4 mg  0.4 mg Q5 MIN PRN Alonzo Vieira M.D.       • morphine (pf) 4 mg/ml injection 2-4 mg  2-4 mg Q3HRS PRN Alonzo Vieira M.D.       • atorvastatin (LIPITOR) tablet 80 mg  80 mg Q EVENING Alonzo Vieira M.D.   80 mg at 03/24/19 7558   •  aspirin EC (ECOTRIN) tablet 81 mg  81 mg DAILY Alonzo Vieira M.D.   81 mg at 03/25/19 0521   • Respiratory Care per Protocol   Continuous RT Alonzo Vieira M.D.       • acetaminophen (TYLENOL) tablet 650 mg  650 mg Q6HRS PRN Alonzo Vieira M.D.       • ondansetron (ZOFRAN) syringe/vial injection 4 mg  4 mg Q4HRS PRN Alonzo Vieira M.D.       • ondansetron (ZOFRAN ODT) dispertab 4 mg  4 mg Q4HRS PRN Alonzo Vieira M.D.       • heparin injection 3,200 Units  3,200 Units PRN Alonzo Vieira M.D.        And   • heparin infusion 25,000 units in 500 ml 0.45% nacl   Continuous Alonzo Vieira M.D. 24 mL/hr at 03/25/19 0913 1,200 Units/hr at 03/25/19 0913     Last reviewed on 3/24/2019  8:34 PM by Dalila Leal, T  No Known Allergies    Physical Exam  Body mass index is 31.07 kg/m². Blood pressure 124/74, pulse (!) 56, temperature 36.8 °C (98.3 °F), temperature source Temporal, resp. rate 17, height 1.829 m (6'), weight 103.9 kg (229 lb 0.9 oz), SpO2 94 %. Vitals:    03/24/19 2300 03/25/19 0004 03/25/19 0420 03/25/19 0747   BP: (!) 163/98  (!) 97/52 124/74   Pulse: 69 65 (!) 59 (!) 56   Resp: 17 16 17 17   Temp: 36.7 °C (98 °F)  37.1 °C (98.7 °F) 36.8 °C (98.3 °F)   TempSrc: Temporal  Temporal Temporal   SpO2: 94% 96% 92% 94%   Weight: 103.9 kg (229 lb 0.9 oz)      Height:        Oxygen Therapy:  Pulse Oximetry: 94 %, O2 (LPM): 0, FiO2%: 21 %, O2 Delivery: None (Room Air)    General: No apparent distress  HEENT: NA, AT, conjunctiva normal no, JVD   Lungs: normal respiratory effort, CTAB  Heart: RRR, no murmurs, no rubs or gallops,   EXT: No edema. + pedal pulses. no cyanosis  Abdomen: soft, non tender, non distended  Neurological: AAO x 3. No focal sensory deficits  Skin: Warm extremities    Labs (personally reviewed and notable for):   Recent Results (from the past 24 hour(s))   TROPONIN    Collection Time: 03/24/19  6:55 PM   Result Value Ref Range    Troponin I 4.17 (H) 0.00 - 0.04 ng/mL   Prothrombin Time    Collection Time:  19  6:55 PM   Result Value Ref Range    PT 14.0 12.0 - 14.6 sec    INR 1.07 0.87 - 1.13   APTT    Collection Time: 19  6:55 PM   Result Value Ref Range    APTT 29.6 24.7 - 36.0 sec   Comp Metabolic Panel    Collection Time: 19  6:55 PM   Result Value Ref Range    Sodium 141 135 - 145 mmol/L    Potassium 3.8 3.6 - 5.5 mmol/L    Chloride 110 96 - 112 mmol/L    Co2 24 20 - 33 mmol/L    Anion Gap 7.0 0.0 - 11.9    Glucose 92 65 - 99 mg/dL    Bun 13 8 - 22 mg/dL    Creatinine 0.85 0.50 - 1.40 mg/dL    Calcium 9.1 8.5 - 10.5 mg/dL    AST(SGOT) 23 12 - 45 U/L    ALT(SGPT) 16 2 - 50 U/L    Alkaline Phosphatase 51 30 - 99 U/L    Total Bilirubin 0.4 0.1 - 1.5 mg/dL    Albumin 4.1 3.2 - 4.9 g/dL    Total Protein 7.1 6.0 - 8.2 g/dL    Globulin 3.0 1.9 - 3.5 g/dL    A-G Ratio 1.4 g/dL   ESTIMATED GFR    Collection Time: 19  6:55 PM   Result Value Ref Range    GFR If African American >60 >60 mL/min/1.73 m 2    GFR If Non African American >60 >60 mL/min/1.73 m 2   EKG    Collection Time: 19  7:13 PM   Result Value Ref Range    Report       [facility] Emergency Dept.    Test Date:  2019  Pt Name:    IDANE WICK              Department: ER  MRN:        [id number]                      Room:       [facility]  Gender:     Male                         Technician: [id number]  :        1955                   Requested By:SAMANTHA RICE  Order #:    [id number]                    Gogo MD:    Measurements  Intervals                                Axis  Rate:       68                           P:          -10  OR:         144                          QRS:        55  QRSD:       98                           T:          4  QT:         380  QTc:        405    Interpretive Statements  SINUS RHYTHM  No previous ECG available for comparison     Basic Metabolic Panel (BMP)    Collection Time: 19  2:25 AM   Result Value Ref Range    Sodium 141 135 - 145 mmol/L    Potassium 3.5 (L) 3.6 -  5.5 mmol/L    Chloride 109 96 - 112 mmol/L    Co2 23 20 - 33 mmol/L    Glucose 112 (H) 65 - 99 mg/dL    Bun 12 8 - 22 mg/dL    Creatinine 0.72 0.50 - 1.40 mg/dL    Calcium 8.6 8.5 - 10.5 mg/dL    Anion Gap 9.0 0.0 - 11.9   BType Natriuretic Peptide (BNP)    Collection Time: 03/25/19  2:25 AM   Result Value Ref Range    B Natriuretic Peptide 281 (H) 0 - 100 pg/mL   CBC with Differential    Collection Time: 03/25/19  2:25 AM   Result Value Ref Range    WBC 14.2 (H) 4.8 - 10.8 K/uL    RBC 5.27 4.70 - 6.10 M/uL    Hemoglobin 15.5 14.0 - 18.0 g/dL    Hematocrit 46.8 42.0 - 52.0 %    MCV 88.8 81.4 - 97.8 fL    MCH 29.4 27.0 - 33.0 pg    MCHC 33.1 (L) 33.7 - 35.3 g/dL    RDW 46.0 35.9 - 50.0 fL    Platelet Count 219 164 - 446 K/uL    MPV 11.0 9.0 - 12.9 fL    Neutrophils-Polys 62.70 44.00 - 72.00 %    Lymphocytes 26.30 22.00 - 41.00 %    Monocytes 7.70 0.00 - 13.40 %    Eosinophils 2.30 0.00 - 6.90 %    Basophils 0.60 0.00 - 1.80 %    Immature Granulocytes 0.40 0.00 - 0.90 %    Nucleated RBC 0.00 /100 WBC    Neutrophils (Absolute) 8.88 (H) 1.82 - 7.42 K/uL    Lymphs (Absolute) 3.72 1.00 - 4.80 K/uL    Monos (Absolute) 1.09 (H) 0.00 - 0.85 K/uL    Eos (Absolute) 0.32 0.00 - 0.51 K/uL    Baso (Absolute) 0.09 0.00 - 0.12 K/uL    Immature Granulocytes (abs) 0.05 0.00 - 0.11 K/uL    NRBC (Absolute) 0.00 K/uL   HEMOGLOBIN A1C    Collection Time: 03/25/19  2:25 AM   Result Value Ref Range    Glycohemoglobin 6.0 (H) 0.0 - 5.6 %    Est Avg Glucose 126 mg/dL   Lipid Profile (Tomorrow AM)    Collection Time: 03/25/19  2:25 AM   Result Value Ref Range    Cholesterol,Tot 145 100 - 199 mg/dL    Triglycerides 159 (H) 0 - 149 mg/dL    HDL 31 (A) >=40 mg/dL    LDL 82 <100 mg/dL   Troponin in four (4) hours    Collection Time: 03/25/19  2:25 AM   Result Value Ref Range    Troponin I 3.76 (H) 0.00 - 0.04 ng/mL   TSH WITH REFLEX TO FT4    Collection Time: 03/25/19  2:25 AM   Result Value Ref Range    TSH 4.630 0.380 - 5.330 uIU/mL   APTT     Collection Time: 19  2:25 AM   Result Value Ref Range    APTT 70.2 (H) 24.7 - 36.0 sec   MAGNESIUM    Collection Time: 19  2:25 AM   Result Value Ref Range    Magnesium 1.8 1.5 - 2.5 mg/dL   ESTIMATED GFR    Collection Time: 19  2:25 AM   Result Value Ref Range    GFR If African American >60 >60 mL/min/1.73 m 2    GFR If Non African American >60 >60 mL/min/1.73 m 2   EKG in four (4) hours    Collection Time: 19  5:45 AM   Result Value Ref Range    Report       [facility]    Test Date:  2019  Pt Name:    DIANE WICK              Department: 183  MRN:        [id number]                      Room:       T823  Gender:     Male                         Technician: JO  :        1955                   Requested By:DIYA JO  Order #:    [id number]                    Reading MD: Rox Valencia MD    Measurements  Intervals                                Axis  Rate:       59                           P:          64  TX:         150                          QRS:        75  QRSD:       101                          T:          47  QT:         427  QTc:        423    Interpretive Statements  SINUS BRADYCARDIA  BORDERLINE ST ELEVATION, LATERAL LEADS  Compared to ECG 2019 19:13:19  ST (T wave) deviation now present  Sinus rhythm no longer present    Electronically Signed On 3- 7:24:34 PDT by Rox Valencia MD     Complete Metabolic Panel (CMP)    Collection Time: 19  8:32 AM   Result Value Ref Range    Sodium 138 135 - 145 mmol/L    Potassium 4.1 3.6 - 5.5 mmol/L    Chloride 110 96 - 112 mmol/L    Co2 22 20 - 33 mmol/L    Anion Gap 6.0 0.0 - 11.9    Glucose 111 (H) 65 - 99 mg/dL    Bun 13 8 - 22 mg/dL    Creatinine 0.71 0.50 - 1.40 mg/dL    Calcium 8.6 8.5 - 10.5 mg/dL    AST(SGOT) 25 12 - 45 U/L    ALT(SGPT) 13 2 - 50 U/L    Alkaline Phosphatase 44 30 - 99 U/L    Total Bilirubin 0.8 0.1 - 1.5 mg/dL    Albumin 3.7 3.2 - 4.9 g/dL    Total Protein 6.8 6.0 -  8.2 g/dL    Globulin 3.1 1.9 - 3.5 g/dL    A-G Ratio 1.2 g/dL   CBC without Differential    Collection Time: 03/25/19  8:32 AM   Result Value Ref Range    WBC 12.4 (H) 4.8 - 10.8 K/uL    RBC 5.50 4.70 - 6.10 M/uL    Hemoglobin 15.9 14.0 - 18.0 g/dL    Hematocrit 49.0 42.0 - 52.0 %    MCV 89.1 81.4 - 97.8 fL    MCH 28.9 27.0 - 33.0 pg    MCHC 32.4 (L) 33.7 - 35.3 g/dL    RDW 47.0 35.9 - 50.0 fL    Platelet Count 207 164 - 446 K/uL    MPV 10.6 9.0 - 12.9 fL   INR (Prothrombin/ INR)    Collection Time: 03/25/19  8:32 AM   Result Value Ref Range    PT 14.8 (H) 12.0 - 14.6 sec    INR 1.15 (H) 0.87 - 1.13   APTT    Collection Time: 03/25/19  8:32 AM   Result Value Ref Range    APTT 64.0 (H) 24.7 - 36.0 sec   ESTIMATED GFR    Collection Time: 03/25/19  8:32 AM   Result Value Ref Range    GFR If African American >60 >60 mL/min/1.73 m 2    GFR If Non African American >60 >60 mL/min/1.73 m 2   EC-ECHOCARDIOGRAM COMPLETE W/O CONT    Collection Time: 03/25/19  9:18 AM   Result Value Ref Range    Eject.Frac. MOD BP 58.82     Eject.Frac. MOD 4C 54.7     Eject.Frac. MOD 2C 60.7     Left Ventrical Ejection Fraction 55        Cardiac Imaging and Procedures Review:    EKG and telemetry tracings personally reviewed      ASSESSMENT & PLAN  # NSTEMI   - chest pressure , radiated to left arm, associated with diaphoresis  - Trop 4.17---3.76  - EKG : SINUS BRADYCARDIA   BORDERLINE ST ELEVATION, LATERAL LEADS   - ECHO :ECHO : EF 55% Normal left ventricular systolic function.  There is apical anterior, apical septal apical inferior, and apical   akinesis.Mid anterior and mid anteroseptal hypokinesis.  Normal diastolic function.The right ventricle was normal in size and function.  Mild mitral regurgitation.  Moderate tricuspid regurgitation   - On heparin drip   - Nitro PRN for chest pain  - On Metoprolol 25 mg po bid, ASA, Lipitor 80 mg po daily  - Cardiac Cath Today.     It is my pleasure to participate in the care of Mr. Lane.   Please do not hesitate to contact me with questions or concerns.

## 2019-03-25 NOTE — H&P
Hospital Medicine History & Physical Note    Date of Service  3/24/2019    Primary Care Physician  Bertin Liu M.D.    Consultants  Cardiology    Code Status  Full code    Chief Complaint  Chest pain    History of Presenting Illness  63 y.o. male who presented 3/24/2019 with chest pain earlier today.  The patient had spent all morning skiing and then developed substernal chest pain with no radiation.  He reported associated diaphoresis and mild shortness of breath.  He was taken to a outlying facility where he was found to have an elevated troponin of 0.58.  Patient was given a full dose of aspirin and transferred to [facility] for higher level of care.  His troponin increased to 4.17.  At this time the patient is chest pain-free.  The case was discussed with cardiology and the patient has been started on IV heparin and will undergo cardiac catheterization likely tomorrow.  Patient smokes cigars occasionally.  He denies any previous history of MI, diabetes or hypertension.  He reports heart disease in his mother and brother.    EKG interpreted by me reveals sinus rhythm with T wave inversion in lead III.  No ST elevation or ST depression  Chest x-ray interpreted by me reveals no acute cardiopulmonary process    Review of Systems  Review of Systems   Constitutional: Negative for chills, diaphoresis and fever.   HENT: Negative for hearing loss and sore throat.    Eyes: Negative for blurred vision.   Respiratory: Positive for shortness of breath. Negative for cough, sputum production and wheezing.    Cardiovascular: Positive for chest pain. Negative for palpitations and leg swelling.   Gastrointestinal: Negative for abdominal pain, blood in stool, diarrhea, nausea and vomiting.   Genitourinary: Negative for dysuria, flank pain and urgency.   Musculoskeletal: Negative for back pain, joint pain, myalgias and neck pain.   Skin: Negative for rash.   Neurological: Negative for dizziness, focal weakness, seizures and  headaches.   Endo/Heme/Allergies: Does not bruise/bleed easily.   Psychiatric/Behavioral: Negative for suicidal ideas.   All other systems reviewed and are negative.      Past Medical History   has a past medical history of Fracture.    Surgical History  No pertinent surgical history    Family History  family history includes Heart Disease in his mother.     Social History   reports that he has been smoking Cigars.  He has never used smokeless tobacco. He reports that he uses drugs, including Inhaled. He reports that he does not drink alcohol.    Allergies  No Known Allergies    Medications  None       Physical Exam  Temp:  [36.6 °C (97.9 °F)] 36.6 °C (97.9 °F)  Pulse:  [68] 68  Resp:  [17] 17  BP: (133)/(74) 133/74  SpO2:  [95 %] 95 %    Physical Exam   Constitutional: He is oriented to person, place, and time. He appears well-developed and well-nourished. No distress.   HENT:   Head: Normocephalic and atraumatic.   Mouth/Throat: Oropharynx is clear and moist.   Eyes: Pupils are equal, round, and reactive to light. Conjunctivae are normal. No scleral icterus.   Neck: Normal range of motion. Neck supple.   Cardiovascular: Normal rate, regular rhythm and normal heart sounds.    Pulmonary/Chest: Effort normal and breath sounds normal. No respiratory distress. He has no wheezes. He has no rales.   Abdominal: Soft. Bowel sounds are normal. He exhibits no distension. There is no tenderness. There is no rebound.   Musculoskeletal: Normal range of motion. He exhibits no edema or tenderness.   Lymphadenopathy:     He has no cervical adenopathy.   Neurological: He is alert and oriented to person, place, and time. No cranial nerve deficit. Coordination normal.   Skin: Skin is warm. No rash noted.   Psychiatric: He has a normal mood and affect. His behavior is normal.   Nursing note and vitals reviewed.      Laboratory:      Recent Labs      03/24/19   1855   SODIUM  141   POTASSIUM  3.8   CHLORIDE  110   CO2  24   GLUCOSE  92    BUN  13   CREATININE  0.85   CALCIUM  9.1     Recent Labs      03/24/19 1855   ALTSGPT  16   ASTSGOT  23   ALKPHOSPHAT  51   TBILIRUBIN  0.4   GLUCOSE  92     Recent Labs      03/24/19 1855   APTT  29.6   INR  1.07             Recent Labs      03/24/19 1855   TROPONINI  4.17*       Urinalysis:    No results found     Imaging:  OUTSIDE IMAGES-DX CHEST   Final Result      EC-ECHOCARDIOGRAM COMPLETE W/O CONT    (Results Pending)         Assessment/Plan:  I anticipate this patient will require at least two midnights for appropriate medical management, necessitating inpatient admission.    NSTEMI (non-ST elevated myocardial infarction) (HCC)- (present on admission)   Assessment & Plan    Patient will be admitted to the telemetry unit with close cardiac monitoring, serial EKG and troponin  Patient has received a full dose of aspirin and is started on IV heparin, monitor APTT  I will start the patient on metoprolol 25 mg twice daily and atorvastatin 80 mg daily  Check 2D echo, lipid panel, TSH and hemoglobin A1c  Nitro and morphine when necessary for chest pain  Cardiology has been consulted       Leukocytosis- (present on admission)   Assessment & Plan    Likely reactive  Monitor CBC and vitals     Hypokalemia- (present on admission)   Assessment & Plan    Replete with K Dur 40  Monitor BMP         VTE prophylaxis: Heparin

## 2019-03-25 NOTE — THERAPY
PT cardiac rehab phase I order received; pt awaiting further workup. Will attempt eval when appropriate.

## 2019-03-25 NOTE — CARE PLAN
Problem: Venous Thromboembolism (VTW)/Deep Vein Thrombosis (DVT) Prevention:  Goal: Patient will participate in Venous Thrombosis (VTE)/Deep Vein Thrombosis (DVT)Prevention Measures  Outcome: PROGRESSING AS EXPECTED  Patient will not develop DVT during shift.    Problem: Knowledge Deficit  Goal: Knowledge of disease process/condition, treatment plan, diagnostic tests, and medications will improve  Outcome: PROGRESSING AS EXPECTED  Patient will be able to explain why he needs to be NPO prior to procedure during shift.    Problem: Respiratory:  Goal: Respiratory status will improve  Outcome: PROGRESSING AS EXPECTED  Patient will maintain oxygen saturation of at least 90% during shift.

## 2019-03-25 NOTE — PROGRESS NOTES
Received report from previous nurse regarding time in ED.  Patient attached to zoll and transported to Telemetry 8.  Patient then attached to heart monitor and monitor room confirmed Sinus Rhythm.  POC reviewed with pt, white board updated, pt verbalized understanding, call light within reach.  Pt encouraged to call before getting up.  Bed in lowest position, treaded slippers on.

## 2019-03-25 NOTE — ED PROVIDER NOTES
"ED Provider Note    CHIEF COMPLAINT  Chief Complaint   Patient presents with   • Chest Pain       HPI  Niall Lane is a 63 y.o. male who presents for evaluation of chest discomfort.  The patient was transferred from [facility].  Apparently he is quite active healthy 63-year-old gentleman.  He has no stated or significant cardiopulmonary or vascular disease.  He ski down the long run approximately 5 miles with several thousand vertical feet descent earlier today.  He had substernal chest discomfort.  He reports that it felt like a \"air bubble he needed to burp up.  \".  At the [facility] he did have an elevated troponin at 0.58.  He has no known history of heart disease.  Chest pain resolved he was given an aspirin.  He denies any pain radiating to the shoulder blades no history of hypertension no leg pain or swelling no hemoptysis.  He does have very strong family history of heart disease in his older brother who had quadruple bypass at age 67.  The patient currently denies chest discomfort.  No other complaints    REVIEW OF SYSTEMS  See HPI for further details.  No night sweats weight loss numbness tingling weakness rash all other systems are negative.     PAST MEDICAL HISTORY  Past Medical History:   Diagnosis Date   • Fracture        FAMILY HISTORY  Extensive history of coronary artery disease    SOCIAL HISTORY  Social History     Social History   • Marital status:      Spouse name: N/A   • Number of children: N/A   • Years of education: N/A     Social History Main Topics   • Smoking status: Light Tobacco Smoker     Types: Cigars   • Smokeless tobacco: Never Used   • Alcohol use No   • Drug use: Yes     Types: Inhaled      Comment: marijuana   • Sexual activity: Not on file     Other Topics Concern   • Not on file     Social History Narrative   • No narrative on file     No IV drugs  SURGICAL HISTORY  No past surgical history on file.  No major chest or abdominal surgeries  CURRENT " MEDICATIONS  Home Medications    **Home medications have not yet been reviewed for this encounter**     No regular meds    ALLERGIES  No Known Allergies    PHYSICAL EXAM  VITAL SIGNS: /74   Pulse 68   Temp 36.6 °C (97.9 °F) (Temporal)   Resp 17   Ht 1.829 m (6')   Wt 97.5 kg (215 lb)   SpO2 95%   BMI 29.16 kg/m²       Constitutional: Well developed, Well nourished, No acute distress, Non-toxic appearance.   HENT: Normocephalic, Atraumatic, Bilateral external ears normal, Oropharynx moist, No oral exudates, Nose normal.   Eyes: PERRLA, EOMI, Conjunctiva normal, No discharge.   Neck: Normal range of motion, No tenderness, Supple, No stridor.  Cardiovascular: Normal heart rate, Normal rhythm, No murmurs, No rubs, No gallops.   Thorax & Lungs: Normal breath sounds, No respiratory distress, No wheezing, No chest tenderness.   Abdomen: Bowel sounds normal, Soft, No tenderness, No masses, No pulsatile masses.   Skin: Warm, Dry, No erythema, No rash.   Back: No tenderness, No CVA tenderness.   Extremities: Intact distal pulses, No edema, No tenderness, No cyanosis, No clubbing.   Musculoskeletal: Good range of motion in all major joints. No tenderness to palpation or major deformities noted.   Neurologic: Alert & oriented x 3, Normal motor function, Normal sensory function, No focal deficits noted.   Psychiatric: Anxious.     EKG  EKG interpretation by me rate 68 sinus rhythm no acute ST segment elevation or depression no pathological T wave inversion no ectopy intervals and axis are normal no suggestion of ectopy no suggestion of ischemia or arrhythmia    RADIOLOGY/PROCEDURES  Chest x-ray from outside facility is unremarkable    COURSE & MEDICAL DECISION MAKING  Pertinent Labs & Imaging studies reviewed. (See chart for details)  Results for orders placed or performed during the hospital encounter of 03/24/19   TROPONIN   Result Value Ref Range    Troponin I 4.17 (H) 0.00 - 0.04 ng/mL   Prothrombin Time    Result Value Ref Range    PT 14.0 12.0 - 14.6 sec    INR 1.07 0.87 - 1.13   APTT   Result Value Ref Range    APTT 29.6 24.7 - 36.0 sec   Comp Metabolic Panel   Result Value Ref Range    Sodium 141 135 - 145 mmol/L    Potassium 3.8 3.6 - 5.5 mmol/L    Chloride 110 96 - 112 mmol/L    Co2 24 20 - 33 mmol/L    Anion Gap 7.0 0.0 - 11.9    Glucose 92 65 - 99 mg/dL    Bun 13 8 - 22 mg/dL    Creatinine 0.85 0.50 - 1.40 mg/dL    Calcium 9.1 8.5 - 10.5 mg/dL    AST(SGOT) 23 12 - 45 U/L    ALT(SGPT) 16 2 - 50 U/L    Alkaline Phosphatase 51 30 - 99 U/L    Total Bilirubin 0.4 0.1 - 1.5 mg/dL    Albumin 4.1 3.2 - 4.9 g/dL    Total Protein 7.1 6.0 - 8.2 g/dL    Globulin 3.0 1.9 - 3.5 g/dL    A-G Ratio 1.4 g/dL   ESTIMATED GFR   Result Value Ref Range    GFR If African American >60 >60 mL/min/1.73 m 2    GFR If Non African American >60 >60 mL/min/1.73 m 2   EKG   Result Value Ref Range    Report       [facility] Emergency Dept.    Test Date:  2019  Pt Name:    DIANE WICK              Department: ER  MRN:        [id number]                      Room:       [facility]  Gender:     Male                         Technician: [id number]  :        1955                   Requested By:SAMANTHA RICE  Order #:    [id number]                    Gogo MD:    Measurements  Intervals                                Axis  Rate:       68                           P:          -10  VT:         144                          QRS:        55  QRSD:       98                           T:          4  QT:         380  QTc:        405    Interpretive Statements  SINUS RHYTHM  No previous ECG available for comparison        I reviewed the patient's records.  He had an EKG on arrival which did not demonstrate any ST segment elevation.  He did not have any ongoing chest pain but his troponin appears to be trending upwards quite dramatically up to 4.  Emergent consultation with cardiology was obtained with Dr. Valencia.  Patient will be  admitted likely to the [facility] for an acute coronary syndrome.  He will undergo catheterization likely tomorrow no indication for emergent catheterization tonight.  Patient will be heparinized.  He is currently chest pain-free and his blood pressure is under stable and normal parameters    CRITICAL CARE TIME:    The patient required approximately 35 minutes worth of critical care time. This excludes any procedures. This includes time spent directly at caring for the patient, making critical medical decisions, involving consultants and speaking with the family.  FINAL IMPRESSION  1.  Acute coronary syndrome  2.  Non-ST segment elevation myocardial infarction      Electronically signed by: Poli Kelly, 3/24/2019 7:02 PM

## 2019-03-25 NOTE — PROGRESS NOTES
· 2 RN skin check compete with JOSEPH Bernal.  · Devices in place N/A.  · Skin assessed under devices N/A.  · Confirmed pressure ulcers found on N/A.  · New potential pressure ulcers noted on N/A.   · The following interventions in place pressure distribution mattress, moisturizer.    Patient's bilateral ears and elbows are pink and blanching.  Patient's bridge of nose is red and depressed where pads from glasses sit.  Patient's sacrum is pink and blanching.  Patient's bilateral heels are dry and blanching.  Some redness on toes present on bilateral feet.

## 2019-03-26 ENCOUNTER — PATIENT OUTREACH (OUTPATIENT)
Dept: HEALTH INFORMATION MANAGEMENT | Facility: OTHER | Age: 64
End: 2019-03-26

## 2019-03-26 VITALS
SYSTOLIC BLOOD PRESSURE: 142 MMHG | HEART RATE: 76 BPM | DIASTOLIC BLOOD PRESSURE: 81 MMHG | OXYGEN SATURATION: 94 % | RESPIRATION RATE: 18 BRPM | WEIGHT: 229.06 LBS | BODY MASS INDEX: 31.03 KG/M2 | TEMPERATURE: 99 F | HEIGHT: 72 IN

## 2019-03-26 LAB — EKG IMPRESSION: NORMAL

## 2019-03-26 PROCEDURE — A9270 NON-COVERED ITEM OR SERVICE: HCPCS | Performed by: INTERNAL MEDICINE

## 2019-03-26 PROCEDURE — 700102 HCHG RX REV CODE 250 W/ 637 OVERRIDE(OP): Performed by: INTERNAL MEDICINE

## 2019-03-26 PROCEDURE — 93010 ELECTROCARDIOGRAM REPORT: CPT | Performed by: INTERNAL MEDICINE

## 2019-03-26 PROCEDURE — A9270 NON-COVERED ITEM OR SERVICE: HCPCS | Performed by: STUDENT IN AN ORGANIZED HEALTH CARE EDUCATION/TRAINING PROGRAM

## 2019-03-26 PROCEDURE — 99233 SBSQ HOSP IP/OBS HIGH 50: CPT | Mod: GC | Performed by: INTERNAL MEDICINE

## 2019-03-26 PROCEDURE — 99239 HOSP IP/OBS DSCHRG MGMT >30: CPT | Performed by: INTERNAL MEDICINE

## 2019-03-26 PROCEDURE — 700102 HCHG RX REV CODE 250 W/ 637 OVERRIDE(OP): Performed by: STUDENT IN AN ORGANIZED HEALTH CARE EDUCATION/TRAINING PROGRAM

## 2019-03-26 PROCEDURE — 97535 SELF CARE MNGMENT TRAINING: CPT

## 2019-03-26 PROCEDURE — 93005 ELECTROCARDIOGRAM TRACING: CPT | Performed by: INTERNAL MEDICINE

## 2019-03-26 PROCEDURE — 97162 PT EVAL MOD COMPLEX 30 MIN: CPT

## 2019-03-26 RX ORDER — CLOPIDOGREL BISULFATE 75 MG/1
75 TABLET ORAL DAILY
Status: DISCONTINUED | OUTPATIENT
Start: 2019-03-26 | End: 2019-03-26

## 2019-03-26 RX ORDER — ASPIRIN 81 MG/1
81 TABLET, CHEWABLE ORAL DAILY
Qty: 100 TAB | Refills: 0 | Status: SHIPPED | OUTPATIENT
Start: 2019-03-27 | End: 2019-04-22 | Stop reason: SDUPTHER

## 2019-03-26 RX ORDER — PRASUGREL 10 MG/1
10 TABLET, FILM COATED ORAL DAILY
Status: DISCONTINUED | OUTPATIENT
Start: 2019-03-26 | End: 2019-03-26 | Stop reason: HOSPADM

## 2019-03-26 RX ORDER — PRASUGREL 10 MG/1
10 TABLET, FILM COATED ORAL DAILY
Qty: 30 TAB | Refills: 0 | Status: SHIPPED | OUTPATIENT
Start: 2019-03-27 | End: 2019-04-22 | Stop reason: SDUPTHER

## 2019-03-26 RX ORDER — PRASUGREL 10 MG/1
60 TABLET, FILM COATED ORAL ONCE
Status: DISCONTINUED | OUTPATIENT
Start: 2019-03-26 | End: 2019-03-26

## 2019-03-26 RX ORDER — ATORVASTATIN CALCIUM 80 MG/1
80 TABLET, FILM COATED ORAL EVERY EVENING
Qty: 30 TAB | Refills: 0 | Status: SHIPPED | OUTPATIENT
Start: 2019-03-26 | End: 2019-04-22 | Stop reason: SDUPTHER

## 2019-03-26 RX ADMIN — PRASUGREL 10 MG: 10 TABLET, FILM COATED ORAL at 10:47

## 2019-03-26 RX ADMIN — ATORVASTATIN CALCIUM 80 MG: 80 TABLET, FILM COATED ORAL at 19:06

## 2019-03-26 RX ADMIN — METOPROLOL TARTRATE 25 MG: 25 TABLET, FILM COATED ORAL at 06:27

## 2019-03-26 RX ADMIN — ASPIRIN 81 MG 81 MG: 81 TABLET ORAL at 06:27

## 2019-03-26 RX ADMIN — METOPROLOL TARTRATE 25 MG: 25 TABLET, FILM COATED ORAL at 19:06

## 2019-03-26 ASSESSMENT — ENCOUNTER SYMPTOMS
DEPRESSION: 0
DIARRHEA: 0
SHORTNESS OF BREATH: 0
SENSORY CHANGE: 0
SPEECH CHANGE: 0
CONSTIPATION: 0
SORE THROAT: 0
SPUTUM PRODUCTION: 0
CLAUDICATION: 0
ABDOMINAL PAIN: 0
FEVER: 0
HEADACHES: 0
HEMOPTYSIS: 0
CHILLS: 0
BACK PAIN: 0
NECK PAIN: 0
PALPITATIONS: 0
EYE DISCHARGE: 0
DIAPHORESIS: 0
LOSS OF CONSCIOUSNESS: 0
VOMITING: 0
NAUSEA: 0
FOCAL WEAKNESS: 0
BRUISES/BLEEDS EASILY: 0
DIZZINESS: 0
COUGH: 0
MYALGIAS: 0
EYE PAIN: 0

## 2019-03-26 ASSESSMENT — GAIT ASSESSMENTS
GAIT LEVEL OF ASSIST: INDEPENDENT
DISTANCE (FEET): 500

## 2019-03-26 ASSESSMENT — COGNITIVE AND FUNCTIONAL STATUS - GENERAL
SUGGESTED CMS G CODE MODIFIER MOBILITY: CH
MOBILITY SCORE: 24

## 2019-03-26 NOTE — PROGRESS NOTES
[facility] Cardiology Follow-up Consult Note    Date of note:    3/25/2019      Consulting Physician: Lexii Tubbs M.D.      Chief Complaint   Patient presents with   • Chest Pain       Interim Events:  - Tele: [id number] .18/.08/.40  - ECHO : EF 55% Normal left ventricular systolic function.  There is apical anterior, apical septal apical inferior, and apical   akinesis.Mid anterior and mid anteroseptal hypokinesis.  Normal diastolic function.The right ventricle was normal in size and function.  Mild mitral regurgitation.  Moderate tricuspid regurgitation.  - Cardiac Cath 3/15: Coronary artery disease involving the mid left anterior descending artery and adjacent diagonal s/p MITUL,  Left ventricular ejection fraction 75% with focal apical hypokinesis.    ROS  No no recurrent chest pain  No NV, No Bleeding, No dizziness   All other review of systems reviewed and negative.    Past medical, surgical, social, and family history reviewed and unchanged from admission except as noted in assessment and plan.    Medications: Reviewed in MAR  Current Facility-Administered Medications   Medication Dose Frequency Provider Last Rate Last Dose   • prasugrel (EFFIENT) tablet 10 mg  10 mg DAILY Gerard Lebron M.D.   10 mg at 03/26/19 1047   • NS infusion   Continuous Rox Valencia M.D.   Stopped at 03/25/19 2100   • metoprolol (LOPRESSOR) tablet 25 mg  25 mg TWICE DAILY Rich Castillo M.D.   25 mg at 03/26/19 0627   • aspirin (ASA) chewable tab 81 mg  81 mg DAILY Gerard Lebron M.D.   81 mg at 03/26/19 0627   • senna-docusate (PERICOLACE or SENOKOT S) 8.6-50 MG per tablet 2 Tab  2 Tab BID Alonzo Vieira M.D.        And   • polyethylene glycol/lytes (MIRALAX) PACKET 1 Packet  1 Packet QDAY PRN Alonzo Vieira M.D.        And   • magnesium hydroxide (MILK OF MAGNESIA) suspension 30 mL  30 mL QDAY PRN Alonzo Vieira M.D.        And   • bisacodyl (DULCOLAX) suppository 10 mg  10 mg QDAY PRN Alonzo Vieira M.D.       • nitroglycerin  (NITROSTAT) tablet 0.4 mg  0.4 mg Q5 MIN PRN Alonzo Vieira M.D.       • morphine (pf) 4 mg/ml injection 2-4 mg  2-4 mg Q3HRS PRN Alonzo Vieira M.D.       • atorvastatin (LIPITOR) tablet 80 mg  80 mg Q EVENING Alonzo Vieira M.D.   80 mg at 03/25/19 2243   • Respiratory Care per Protocol   Continuous RT Alonzo Vieira M.D.       • acetaminophen (TYLENOL) tablet 650 mg  650 mg Q6HRS PRN Alonzo Vieira M.D.       • ondansetron (ZOFRAN) syringe/vial injection 4 mg  4 mg Q4HRS PRN Alonzo Vieira M.D.       • ondansetron (ZOFRAN ODT) dispertab 4 mg  4 mg Q4HRS PRN Alonzo Vieira M.D.         Last reviewed on 3/24/2019  8:34 PM by Dalila Leal, T  No Known Allergies    Physical Exam  Body mass index is 31.07 kg/m². Blood pressure 137/40, pulse (!) 54, temperature 36.4 °C (97.6 °F), temperature source Temporal, resp. rate 17, height 1.829 m (6'), weight 103.9 kg (229 lb 0.9 oz), SpO2 96 %.   Vitals:    03/26/19 0202 03/26/19 0302 03/26/19 0402 03/26/19 0755   BP: (!) 93/57 114/58 107/68 137/40   Pulse: (!) 57 (!) 59 63 (!) 54   Resp: 17 17 17 17   Temp: 36.8 °C (98.3 °F) 36.4 °C (97.5 °F) 37.5 °C (99.5 °F) 36.4 °C (97.6 °F)   TempSrc: Temporal Temporal Temporal Temporal   SpO2: 95% 95% 95% 96%   Weight:       Height:        Oxygen Therapy:  Pulse Oximetry: 96 %, O2 (LPM): 0, O2 Delivery: None (Room Air)    General: No apparent distress  HEENT: NA, AT, conjunctiva normal no, JVD   Lungs: normal respiratory effort, CTAB  Heart: RRR, no murmurs, no rubs or gallops,   EXT: No edema. + pedal pulses. no cyanosis  Abdomen: soft, non tender, non distended  Neurological: AAO x 3. No focal sensory deficits  Skin: Warm extremities    Labs (personally reviewed and notable for):   Recent Results (from the past 24 hour(s))   APTT    Collection Time: 03/25/19  2:43 PM   Result Value Ref Range    APTT 57.9 (H) 24.7 - 36.0 sec   EKG STAT    Collection Time: 03/26/19  8:57 AM   Result Value Ref Range    Report       [facility]    Test Date:   2019  Pt Name:    DIANE WICK              Department: 183  MRN:        [id number]                      Room:       T823  Gender:     Male                         Technician: TRICIA  :        1955                   Requested By:JAYA MONTAÑO  Order #:    [id number]                    Reading MD: Xiomy Hammond    Measurements  Intervals                                Axis  Rate:       65                           P:          35  SD:         149                          QRS:        68  QRSD:       103                          T:          17  QT:         386  QTc:        402    Interpretive Statements  SINUS RHYTHM  Biphasic T, CONSIDER ISCHEMIA, ANTERIOR LEADS    Electronically Signed On 3- 9:05:00 PDT by Xiomy Hammond         Cardiac Imaging and Procedures Review:    EKG and telemetry tracings personally reviewed      ASSESSMENT & PLAN  # Coronary artery disease involving the mid left anterior descending artery and adjacent diagonal s/p MITUL  # NSTEMI  - chest pressure , radiated to left arm, associated with diaphoresis  - Trop 4.17---3.76  - EKG : SINUS BRADYCARDIA   BORDERLINE ST ELEVATION, LATERAL LEADS   - ECHO :ECHO : EF 55% Normal left ventricular systolic function.  There is apical anterior, apical septal apical inferior, and apical   akinesis.Mid anterior and mid anteroseptal hypokinesis.Normal diastolic function.The right ventricle was normal in size and function.Mild mitral regurgitation.Moderate tricuspid regurgitation   -Cardiac Cath 3/15: Coronary artery disease involving the mid left anterior descending artery and adjacent diagonal s/p MITUL,  Left ventricular ejection fraction 75% with focal apical hypokinesis.  - Contiue Metoprolol 25 mg po bid, ASA, Lipitor 80 mg po daily  - On ASA & Prasugrel for recent MITUL on 3/25  - From cardiology standpoint. Pt can be discharged home with ASA, Prasugrel , statin & metoprolol   - Pt needs follow up with PCP & cardiology 1-2 weeks after  discharge ( pt has not following up with his PCP for years & would like to have a new PCP ).     It is my pleasure to participate in the care of Mr. Lane.  Please do not hesitate to contact me with questions or concerns.

## 2019-03-26 NOTE — PROCEDURES
DATE OF SERVICE:  03/25/2019    PROCEDURES:  Cardiac catheterization and percutaneous coronary intervention.  A.  Left heart catheterization.  B.  Left ventriculography.  C.  Selective coronary angiography.  D.  Coronary stent implantation, mid left anterior descending   artery, 4.0x38 mm Synergy drug-eluting stent post-dilated 4.5 mm.  E.  Right radial artery approach.  F.  Conscious sedation supervision.    PREPROCEDURE DIAGNOSIS:  Non-ST elevation myocardial infarction.    POSTPROCEDURE DIAGNOSES:  1.  Coronary artery disease involving the mid left anterior   descending artery and adjacent diagonal.  2.  Left ventricular ejection fraction 75% with focal apical hypokinesis.    PHYSICIAN:  Gerard Lebron MD    REFERRING PHYSICIAN:  Brooks Valencia MD    COMPLICATIONS:  None.    MEDICATIONS:  1.  Versed 2 mg IV.  2.  Fentanyl 200 mcg IV.  3.  Lidocaine 2% subcutaneous.  4.  Heparin intravenous infusion discontinued.  5.  Heparin 2000 units IA.  6.  Nitroglycerin 100 mcg IA.  7.  Verapamil 2.5 mg IA.    INDICATIONS:  The patient is a 63-year-old male who was admitted to [facility] with a non-ST elevation myocardial infarction.  The patient is   undergoing a cardiac catheterization for post-MI risk stratification for   future coronary events.    DESCRIPTION OF PROCEDURE:  After informed consent was obtained, the patient   was brought to the cardiac catheterization laboratory where he was prepped,   draped and anesthetized in the usual manner.    After having established adequate collateral circulation to the right hand   with a pressure and oximetry-guided Antonio's test, the volar surface of the   right wrist was prepped, draped and anesthetized in the usual manner.    Using ultrasound guidance and modified Seldinger technique, a 6-Norwegian x 10 cm   introducer sheath was inserted in the right radial artery.  Heparin,   verapamil, and nitroglycerin were given via the side port.    Next, a 4-Norwegian JL  3.5 left coronary catheter was inserted in the ostium of   the left coronary artery and left coronary angiograms were obtained in various   projections.    Next, a 4-Barbadian JR 4.0 right coronary catheter was inserted in the ostium of   the right coronary artery and right coronary angiograms were obtained in   various projections.    Next, a 4-Barbadian pigtail catheter was advanced in the left ventricle on   fluoroscopic guidance.  Single plane ALBA left ventricular angiogram was   performed.  Pre and post-angiogram LVEDP, LV and aortic pressures were   obtained.    Initial review of the angiograms demonstrated significant diffuse disease of   the proximal to mid left anterior descending artery.    Next, Angiomax was given.    Next, a 6-Barbadian extra backup 3.5 guiding catheter was inserted in the ostium   of the left coronary artery.    Next, a 0.014 Runthrough wire was advanced into the distal left anterior   descending artery.    Next, a second 0.014 balanced middleweight guidewire was advanced into the   diagonal branch.    Next, a 3.0x20 mm balloon catheter was inserted across the vessel stenosis and   inflated up to 14 atmospheres.  The balloon was removed.    Next, a 4.0x38 mm Synergy drug-eluting stent was placed across the residual   stenosis and deployed at 11 atmospheres.  Next, a 4.5x15 mm noncompliant   balloon catheter was inserted and inflations of 10 and 12 atmospheres were   performed throughout the stented segment.  The balloon and wire were removed   and final angiograms were performed.    At the end of procedure, catheters were removed.  Hemostasis was achieved with   a wrist band device.  The patient tolerated the procedure well.    CONSCIOUS SEDATION SUPERVISION:  I supervised and monitored the administration   of intravenous conscious sedation from 5:30 p.m. until 6:08 p.m.    FINDINGS:  HEMODYNAMICS:  LEFT HEART PRESSURES:  1.  LVEDP 12 mm.  2.  Left ventricular systolic pressure 103 mmHg.  3.   Central aortic pressure systolic 100, diastolic 59, mean of 76 mmHg.    LEFT VENTRICULOGRAPHY:  Left ventricular chamber size is normal.  Calculated   ejection fraction post PVC, 79%, focal apical hypokinesis.    CORONARY ARTERIOGRAPHY:  1.  Left main artery:  Left main is a large caliber vessel and is   angiographically patent and bifurcates into a left anterior descending artery   and circumflex artery.  2.  Left anterior descending artery:  Left anterior descending artery gives   rise to a first septal branch, a long diagonal branch and extends around the   apex.  The mid left anterior descending artery has a long 95% stenosis involving   the diagonal branch with the remainder of the vessel patent with diffuse   atheromatous disease.  3.  Circumflex artery:  The circumflex gives rise to 2 marginal branches and   angiographically patent.  4.  Right coronary artery:  Right coronary artery is a large dominant vessel,   gives rise to posterior descending artery and posterolateral branch.  The   right coronary artery is patent with diffuse atheromatous disease and mild   stenosis of the ostium of the posterior descending artery.    POST-STENT IMPLANTATION of the mid left anterior descending artery with a   4.0x38 mm Synergy drug-eluting stent postdilated with 4.5 mm, demonstrates   good stent expansion, no evidence of dissection or thrombus and ROSEMARIE 3   antegrade flow.  There was plaque shifting in the ostium of the diagonal   branch, which maintained ROSEMARIE 3 flow.    PLAN:  Maximal optimal medical therapy for the patient's coronary artery   disease post-myocardial infarction post-coronary intervention.       ____________________________________     MD CHRISTOPHER KRISHNAN / [place]    DD:  03/26/2019 04:37:24  DT:  03/26/2019 05:25:49    D#:  [id number]  Job#:  [id number]

## 2019-03-26 NOTE — CARE PLAN
Problem: Safety  Goal: Will remain free from falls  Outcome: PROGRESSING AS EXPECTED  Patient will remain free from falls during shift.    Problem: Knowledge Deficit  Goal: Knowledge of disease process/condition, treatment plan, diagnostic tests, and medications will improve  Outcome: PROGRESSING AS EXPECTED  Patient will be able to verbalize what his hemoband is for and how we are using it to prevent bleeding after his heart cath.    Problem: Discharge Barriers/Planning  Goal: Patient's continuum of care needs will be met  Outcome: PROGRESSING AS EXPECTED  Barriers to discharge will be eliminated during shift.

## 2019-03-26 NOTE — PROGRESS NOTES
Hospital Medicine Daily Progress Note    Date of Service  3/26/2019    Chief Complaint  63 y.o. male admitted 3/24/2019 with chest pain.    Hospital Course    3/25:  This 62yo male with no prior cardiac disease, diet controlled prediabetes presented with significant chest pressure, diaphoresis and SOB while carrying ski equipment to car after day of skiing.  He was seen at OSH and transferred to [facility] where troponin max to 4.17.  Cardiology consulted, heparin drip, plan for cath lab today, NPO.  a1c 6%, BP low on exam 96/52 s/p metoprolol 25 bid.  NSR on monitor.  No further chest pain s/p admission.*      Interval note  No acute issue overnight. He was seen and examined today. Discussed with team during rounds. Denies chest pain, nausea, vomiting.    Consultants/Specialty   To     Code Status  full    Disposition  Likely home to family, no needs.    Review of Systems  Review of Systems   Constitutional: Negative for chills, diaphoresis, fever and malaise/fatigue.   HENT: Negative for congestion and sore throat.    Eyes: Negative for pain and discharge.   Respiratory: Negative for cough, hemoptysis, sputum production and shortness of breath.    Cardiovascular: Negative for chest pain, palpitations, claudication and leg swelling.   Gastrointestinal: Negative for abdominal pain, constipation, diarrhea, nausea and vomiting.   Genitourinary: Negative for dysuria and urgency.   Musculoskeletal: Negative for back pain, myalgias and neck pain.   Skin: Negative for itching and rash.   Neurological: Negative for dizziness, sensory change, speech change, focal weakness, loss of consciousness and headaches.   Endo/Heme/Allergies: Does not bruise/bleed easily.   Psychiatric/Behavioral: Negative for depression and suicidal ideas.        Physical Exam  Temp:  [36.4 °C (97.5 °F)-37.5 °C (99.5 °F)] 36.4 °C (97.6 °F)  Pulse:  [51-65] 54  Resp:  [17] 17  BP: ()/(40-78) 137/40  SpO2:  [94 %-97 %] 96 %    Physical Exam    Constitutional: He is oriented to person, place, and time. He appears well-developed and well-nourished. No distress.   HENT:   Head: Normocephalic and atraumatic.   Mouth/Throat: Oropharynx is clear and moist.   Eyes: Pupils are equal, round, and reactive to light. Conjunctivae and EOM are normal. Right eye exhibits no discharge. Left eye exhibits no discharge.   Neck: Normal range of motion. Neck supple. No JVD present. No tracheal deviation present. No thyromegaly present.   Cardiovascular: Normal rate, regular rhythm and normal heart sounds.  Exam reveals no friction rub.    No murmur heard.  Pulmonary/Chest: Effort normal and breath sounds normal. No respiratory distress. He has no wheezes. He has no rales.   Abdominal: Soft. Bowel sounds are normal. He exhibits no distension. There is no tenderness. There is no rebound and no guarding.   Musculoskeletal: Normal range of motion. He exhibits no edema.   Neurological: He is alert and oriented to person, place, and time. No cranial nerve deficit. He exhibits normal muscle tone.   Skin: Skin is warm and dry. No rash noted. He is not diaphoretic. No erythema.   Psychiatric: He has a normal mood and affect. His behavior is normal. Judgment and thought content normal.   Nursing note and vitals reviewed.      Fluids    Intake/Output Summary (Last 24 hours) at 03/26/19 1053  Last data filed at 03/26/19 0900   Gross per 24 hour   Intake              480 ml   Output              300 ml   Net              180 ml       Laboratory  Recent Labs      03/25/19   0225  03/25/19   0832   WBC  14.2*  12.4*   RBC  5.27  5.50   HEMOGLOBIN  15.5  15.9   HEMATOCRIT  46.8  49.0   MCV  88.8  89.1   MCH  29.4  28.9   MCHC  33.1*  32.4*   RDW  46.0  47.0   PLATELETCT  219  207   MPV  11.0  10.6     Recent Labs      03/24/19   1855  03/25/19   0225  03/25/19   0832   SODIUM  141  141  138   POTASSIUM  3.8  3.5*  4.1   CHLORIDE  110  109  110   CO2  24  23  22   GLUCOSE  92  112*  111*    BUN  13  12  13   CREATININE  0.85  0.72  0.71   CALCIUM  9.1  8.6  8.6     Recent Labs      03/24/19   1855  03/25/19   0225  03/25/19   0832  03/25/19   1443   APTT  29.6  70.2*  64.0*  57.9*   INR  1.07   --   1.15*   --      Recent Labs      03/25/19   0225   BNPBTYPENAT  281*     Recent Labs      03/25/19 0225   TRIGLYCERIDE  159*   HDL  31*   LDL  82       Imaging  EC-ECHOCARDIOGRAM COMPLETE W/O CONT   Final Result      OUTSIDE IMAGES-DX CHEST   Final Result      CL-LEFT HEART CATHETERIZATION WITH POSSIBLE INTERVENTION    (Results Pending)        Assessment/Plan  NSTEMI (non-ST elevated myocardial infarction) (HCC)- (present on admission)   Assessment & Plan    Patient has received a full dose of aspirin and is started on IV heparin, monitor APTT initially  Nitro and morphine when necessary for chest pain  Cardiology on  Cath lab post MITUL on LAD  On asa, effient, statin, metoprolol    Echo:  Normal left ventricular systolic function.  Left ventricular ejection fraction is visually estimated to be 55%.  There is apical anterior, apical septal apical inferior, and apical   akinesis.  Mid anterior and mid anteroseptal hypokinesis.  Normal diastolic function.  The right ventricle was normal in size and function.  Mild mitral regurgitation.  Moderate tricuspid regurgitation.         Borderline diabetes mellitus- (present on admission)   Assessment & Plan    Hga1c 6%.  Start diabetic diet  Diabetic education ordered since having significant CAD.       Leukocytosis- (present on admission)   Assessment & Plan    Likely reactive  Trending down  Monitor CBC and vitals     Hypokalemia- (present on admission)   Assessment & Plan    Improving  K 4 today  Replete as needed   Monitor BMP in am          VTE prophylaxis: heparin drip

## 2019-03-26 NOTE — THERAPY
"Physical Therapy Evaluation completed.   Bed Mobility: Independent    Transfers: Independent   Gait: Independent with No Equipment Needed       Plan of Care: Patient with no further skilled PT needs in the acute care setting at this time  Discharge Recommendations: Equipment: No Equipment Needed. Post-acute therapy: Please refer pt to outpatient cardiac rehab when in appropriate stage of recovery.     Pt is s/p NSTEMI and PCI seen for PT cardiac rehab phase I evaluation. He appeared receptive to education given regarding Talk Test, HR monitoring, energy conservation/pacing, Home Walking Program, RPE scale, and daily weights. Pt was able to perform gait with increase in HR from resting 61bpm to 82bpm after 500'. After completing a minute of stairs (5x13), HR increased to 101bpm with corresponding increase in RPE from 6 to 12. BP increased from 150/90mmHg to 203/98mmHg pre/post stairs, respectively. Able to educate pt on results of stair trial and application of results to daily life. Pt verbalized understanding. Educated pt on MET levels as well as he appeared set on returning to skiing soon. Pt does not require further acute PT intervention. Please refer to OP cardiac rehab when in appropriate stage of recovery.     See \"Rehab Therapy-Acute\" Patient Summary Report for complete documentation.     "

## 2019-03-27 NOTE — CONSULTS
Diabetes education: Met with patient and son regarding pre diabetes. Pt states he has been dx with pre diabetes for over 6 years. States he changed his eating and habits and managed it that way. Pt states he has a PCP but has not seen him for more than 1.5 years (or longer), as his wife was sick and made taking care of her a priority. His wife  about 1.5 years ago. Discussed grief, stress management. As he began to speak of his wife he became tearful.  Discussed what diabetes was, difference between type 2 and pre diabetes, how both are diagnosed, what effects blood sugars, eating 3 meals with protein and carbs as well as moderation and why. Discussed importance of following up with his PCP. Pt states he licks his PCP and will call to make an appointment. Pt was active with skiing and kayaking. Discussed effect of exercise. Briefly discussed metformin and finger sticks.  Plan: No further needs a this time. Handouts on pre diabetes given as well as # for type 2 prevention program. Please call [phone] if needs change.

## 2019-03-27 NOTE — DISCHARGE SUMMARY
Discharge Summary    CHIEF COMPLAINT ON ADMISSION  Chief Complaint   Patient presents with   • Chest Pain       Reason for Admission  EMS     Admission Date  3/24/2019    CODE STATUS  Full Code    Echo  Normal left ventricular systolic function.  Left ventricular ejection fraction is visually estimated to be 55%.  There is apical anterior, apical septal apical inferior, and apical   akinesis.  Mid anterior and mid anteroseptal hypokinesis.  Normal diastolic function.  The right ventricle was normal in size and function.  Mild mitral regurgitation.  Moderate tricuspid regurgitation.    HPI & HOSPITAL COURSE  This is a 63 y.o. male here with chest pain. Please refer to H&P for details.  He has no prior cardiac disease, diet controlled prediabetes.  He was seen at OSH and transferred to [facility] where troponin max to 4.17.  Cardiology consulted, heparin drip was started. Card took him to angiogram and put MITUL on LAD. He tolerated the procedure well. He was recommended aggressive medical management after that. He was started on DPAT medications. eho was done and showed LVEF 55%. Please see above for details report. He will be discharged home today and follow up with PCP AND CARDIOLOGY as outpatient. I saw and examined the patient today.    Therefore, he is discharged in fair and stable condition to home with close outpatient follow-up.    The patient met 2-midnight criteria for an inpatient stay at the time of discharge.    Discharge Date  3/26/19    FOLLOW UP ITEMS POST DISCHARGE      DISCHARGE DIAGNOSES  Active Problems:    NSTEMI (non-ST elevated myocardial infarction) (HCC) POA: Yes    Hypokalemia POA: Yes    Leukocytosis POA: Yes    Borderline diabetes mellitus POA: Yes  Resolved Problems:    * No resolved hospital problems. *      FOLLOW UP  Future Appointments  Date Time Provider Department Center   4/22/2019 2:40 PM JAYME Christianson [facility] None     [facility]  [address]  [address]  [place] [address]  [phone]  Call in 1 week  Your physician has referred you to Intensive Cardiac Rehab. You cannot begin ICR for 2 weeks to allow time for your heart to heal. If you do not hear from ICR in about 1 week, please call them to schedule. Thank you!    Bertin Liu M.D.  [address]  [address]  [facility] [address]  [phone]      PLEASE CONTACT YOUR PCP OFFICE DIRECTLY TO ARRANGE A HOSPITAL FOLLOW UP APPOINTMENT. THANK YOU    Figueroa Wheat M.D.  [address]  [address]  [facility] [address]  [phone]    In 1 week        MEDICATIONS ON DISCHARGE     Medication List      START taking these medications      Instructions   aspirin 81 MG Chew chewable tablet  Start taking on:  3/27/2019  Commonly known as:  ASA   Take 1 Tab by mouth every day.  Dose:  81 mg     atorvastatin 80 MG tablet  Commonly known as:  LIPITOR   Take 1 Tab by mouth every evening.  Dose:  80 mg     metoprolol 25 MG Tabs  Commonly known as:  LOPRESSOR   Take 1 Tab by mouth 2 Times a Day.  Dose:  25 mg     prasugrel 10 MG Tabs  Start taking on:  3/27/2019  Commonly known as:  EFFIENT   Take 1 Tab by mouth every day.  Dose:  10 mg            Allergies  No Known Allergies    DIET  Orders Placed This Encounter   Procedures   • Diet Order Cardiac     Standing Status:   Standing     Number of Occurrences:   1     Order Specific Question:   Diet:     Answer:   Cardiac [6]       ACTIVITY  As tolerated.  Weight bearing as tolerated    CONSULTATIONS  cardiology    PROCEDURES  angiogram    LABORATORY  Lab Results   Component Value Date    SODIUM 138 03/25/2019    POTASSIUM 4.1 03/25/2019    CHLORIDE 110 03/25/2019    CO2 22 03/25/2019    GLUCOSE 111 (H) 03/25/2019    BUN 13 03/25/2019    CREATININE 0.71 03/25/2019        Lab Results   Component Value Date    WBC 12.4 (H) 03/25/2019    HEMOGLOBIN 15.9 03/25/2019    HEMATOCRIT 49.0 03/25/2019    PLATELETCT 207 03/25/2019        Total time of the discharge process exceeds 38  minutes.

## 2019-03-27 NOTE — PROGRESS NOTES
Diabetes education: Met with patient and son this afternoon. Please see consult note.  Plan: No further needs a this time. Handouts on pre diabetes given as well as # for type 2 prevention program. Please call [phone] if needs change.

## 2019-03-27 NOTE — DISCHARGE INSTRUCTIONS
Discharge Instructions    Discharged to home by car with relative. Discharged via wheelchair, hospital escort: Yes.  Special equipment needed: Not Applicable    Be sure to schedule a follow-up appointment with your primary care doctor or any specialists as instructed.     Discharge Plan:   Diet Plan: Discussed  Activity Level: Discussed  Confirmed Follow up Appointment: Patient to Call and Schedule Appointment  Confirmed Symptoms Management: Discussed  Medication Reconciliation Updated: Yes  Influenza Vaccine Indication: Patient Refuses    I understand that a diet low in cholesterol, fat, and sodium is recommended for good health. Unless I have been given specific instructions below for another diet, I accept this instruction as my diet prescription.   Other diet: Cardiac    Special Instructions: Diagnosis:  Acute Coronary Syndrome (ACS) is a diagnosis that encompasses cardiac-related chest pain and heart attack. ACS occurs when the blood flow to the heart muscle is severely reduced or cut off completely due to a slow process called atherosclerosis.  Atherosclerosis is a disease in which the coronary arteries become narrow from a buildup of fat, cholesterol, and other substances that combine to form plaque. If the plaque breaks, a blood clot will form and block the blood flow to the heart muscle. This lack of blood flow can cause damage or death to the heart muscle which is called a heart attack or Myocardial Infarction (MI). There are two different types of MIs:  ST Elevation Myocardial Infarction or STEMI (the most severe type of heart attack) and Non-ST Elevation Myocardial Infarction or NSTEMI.    Treatment Plan:  · Cardiac Diet  - Low fat, low salt, low cholesterol   · Cardiac Rehab  - Your doctor has ordered you a referral to [facility].  Call [phone] to schedule an appointment.  · Attend my follow-up appointment with my Cardiologist.  · Take my medications as prescribed by my doctor  · Exercise  daily  · Lower my bad cholesterol and raise my good cholesterol, lower my blood pressure, Reduce stress and Control my diabetes    Medications:  Certain medications are used to treat ACS.  Remember to always take medications as prescribed and never stop talking medications unless told by your doctor.    You have been prescribed the following medicatons:    Aspirin - Aspirin is used as a blood thinning medication and you will require this medication indefinitely.  Anti-platelet/blood thinner - Your Anti-platelet/Blood thinning medication is called Prasugrel, and is used in combination with aspirin to prevent clots from forming in your heart and/or around your stent.  Your doctor will determine how long you need to be on this medicine.  Beta-Blocker - Beta-Blocker Metoprolol is used to lower blood pressure and heart rate, and/or helps your heart heal after a heart attack.  Statin - Statin Atorvastatin is used to lower cholesterol.    · Is patient discharged on Warfarin / Coumadin?   No     Angiogram, Care After  Refer to this sheet in the next few weeks. These instructions provide you with information about caring for yourself after your procedure. Your health care provider may also give you more specific instructions. Your treatment has been planned according to current medical practices, but problems sometimes occur. Call your health care provider if you have any problems or questions after your procedure.  WHAT TO EXPECT AFTER THE PROCEDURE  After your procedure, it is typical to have the following:  · Bruising at the catheter insertion site that usually fades within 1-2 weeks.  · Blood collecting in the tissue (hematoma) that may be painful to the touch. It should usually decrease in size and tenderness within 1-2 weeks.  HOME CARE INSTRUCTIONS  · Take medicines only as directed by your health care provider.  · You may shower 24-48 hours after the procedure or as directed by your health care provider. Remove the  bandage (dressing) and gently wash the site with plain soap and water. Pat the area dry with a clean towel. Do not rub the site, because this may cause bleeding.  · Do not take baths, swim, or use a hot tub until your health care provider approves.  · Check your insertion site every day for redness, swelling, or drainage.  · Do not apply powder or lotion to the site.  · Do not lift over 10 lb (4.5 kg) for 5 days after your procedure or as directed by your health care provider.  · Ask your health care provider when it is okay to:  ¨ Return to work or school.  ¨ Resume usual physical activities or sports.  ¨ Resume sexual activity.  · Do not drive home if you are discharged the same day as the procedure. Have someone else drive you.  · You may drive 24 hours after the procedure unless otherwise instructed by your health care provider.  · Do not operate machinery or power tools for 24 hours after the procedure or as directed by your health care provider.  · If your procedure was done as an outpatient procedure, which means that you went home the same day as your procedure, a responsible adult should be with you for the first 24 hours after you arrive home.  · Keep all follow-up visits as directed by your health care provider. This is important.  SEEK MEDICAL CARE IF:  · You have a fever.  · You have chills.  · You have increased bleeding from the catheter insertion site. Hold pressure on the site.  SEEK IMMEDIATE MEDICAL CARE IF:  · You have unusual pain at the catheter insertion site.  · You have redness, warmth, or swelling at the catheter insertion site.  · You have drainage (other than a small amount of blood on the dressing) from the catheter insertion site.  · The catheter insertion site is bleeding, and the bleeding does not stop after 30 minutes of holding steady pressure on the site.  · The area near or just beyond the catheter insertion site becomes pale, cool, tingly, or numb.     This information is not  intended to replace advice given to you by your health care provider. Make sure you discuss any questions you have with your health care provider.     Document Released: 07/06/2006 Document Revised: 01/08/2016 Document Reviewed: 07/06/2015  [employer] Interactive Patient Education ©2016 [employer]    Atorvastatin tablets  What is this medicine?  ATORVASTATIN (a TORE va sta tin) is known as a HMG-CoA reductase inhibitor or 'statin'. It lowers the level of cholesterol and triglycerides in the blood. This drug may also reduce the risk of heart attack, stroke, or other health problems in patients with risk factors for heart disease. Diet and lifestyle changes are often used with this drug.  This medicine may be used for other purposes; ask your health care provider or pharmacist if you have questions.  COMMON BRAND NAME(S): Lipitor  What should I tell my health care provider before I take this medicine?  They need to know if you have any of these conditions:  -frequently drink alcoholic beverages  -history of stroke, TIA  -kidney disease  -liver disease  -muscle aches or weakness  -other medical condition  -an unusual or allergic reaction to atorvastatin, other medicines, foods, dyes, or preservatives  -pregnant or trying to get pregnant  -breast-feeding  How should I use this medicine?  Take this medicine by mouth with a glass of water. Follow the directions on the prescription label. You can take this medicine with or without food. Take your doses at regular intervals. Do not take your medicine more often than directed.  Talk to your pediatrician regarding the use of this medicine in children. While this drug may be prescribed for children as young as 10 years old for selected conditions, precautions do apply.  Overdosage: If you think you have taken too much of this medicine contact a poison control center or emergency room at once.  NOTE: This medicine is only for you. Do not share this medicine with others.  What  if I miss a dose?  If you miss a dose, take it as soon as you can. If it is almost time for your next dose, take only that dose. Do not take double or extra doses.  What may interact with this medicine?  Do not take this medicine with any of the following medications:  -red yeast rice  -telaprevir  -telithromycin  -voriconazole  This medicine may also interact with the following medications:  -alcohol  -antiviral medicines for HIV or AIDS  -boceprevir  -certain antibiotics like clarithromycin, erythromycin, troleandomycin  -certain medicines for cholesterol like fenofibrate or gemfibrozil  -cimetidine  -clarithromycin  -colchicine  -cyclosporine  -digoxin  -female hormones, like estrogens or progestins and birth control pills  -grapefruit juice  -medicines for fungal infections like fluconazole, itraconazole, ketoconazole  -niacin  -rifampin  -spironolactone  This list may not describe all possible interactions. Give your health care provider a list of all the medicines, herbs, non-prescription drugs, or dietary supplements you use. Also tell them if you smoke, drink alcohol, or use illegal drugs. Some items may interact with your medicine.  What should I watch for while using this medicine?  Visit your doctor or health care professional for regular check-ups. You may need regular tests to make sure your liver is working properly.  Tell your doctor or health care professional right away if you get any unexplained muscle pain, tenderness, or weakness, especially if you also have a fever and tiredness. Your doctor or health care professional may tell you to stop taking this medicine if you develop muscle problems. If your muscle problems do not go away after stopping this medicine, contact your health care professional.  This drug is only part of a total heart-health program. Your doctor or a dietician can suggest a low-cholesterol and low-fat diet to help. Avoid alcohol and smoking, and keep a proper exercise  schedule.  Do not use this drug if you are pregnant or breast-feeding. Serious side effects to an unborn child or to an infant are possible. Talk to your doctor or pharmacist for more information.  This medicine may affect blood sugar levels. If you have diabetes, check with your doctor or health care professional before you change your diet or the dose of your diabetic medicine.  If you are going to have surgery tell your health care professional that you are taking this drug.  What side effects may I notice from receiving this medicine?  Side effects that you should report to your doctor or health care professional as soon as possible:  -allergic reactions like skin rash, itching or hives, swelling of the face, lips, or tongue  -dark urine  -fever  -joint pain  -muscle cramps, pain  -redness, blistering, peeling or loosening of the skin, including inside the mouth  -trouble passing urine or change in the amount of urine  -unusually weak or tired  -yellowing of eyes or skin  Side effects that usually do not require medical attention (report to your doctor or health care professional if they continue or are bothersome):  -constipation  -heartburn  -stomach gas, pain, upset  This list may not describe all possible side effects. Call your doctor for medical advice about side effects. You may report side effects to FDA at 6-440-FDA-0007.  Where should I keep my medicine?  Keep out of the reach of children.  Store at room temperature between 20 to 25 degrees C (68 to 77 degrees F). Throw away any unused medicine after the expiration date.  NOTE: This sheet is a summary. It may not cover all possible information. If you have questions about this medicine, talk to your doctor, pharmacist, or health care provider.  © 2018 [employer] (2012-11-06 09:18:24)    Metoprolol tablets  What is this medicine?  METOPROLOL (me TOE proe lole) is a beta-blocker. Beta-blockers reduce the workload on the heart and help it to  beat more regularly. This medicine is used to treat high blood pressure and to prevent chest pain. It is also used to after a heart attack and to prevent an additional heart attack from occurring.  This medicine may be used for other purposes; ask your health care provider or pharmacist if you have questions.  COMMON BRAND NAME(S): Lopressor  What should I tell my health care provider before I take this medicine?  They need to know if you have any of these conditions:  -diabetes  -heart or vessel disease like slow heart rate, worsening heart failure, heart block, sick sinus syndrome or Raynaud's disease  -kidney disease  -liver disease  -lung or breathing disease, like asthma or emphysema  -pheochromocytoma  -thyroid disease  -an unusual or allergic reaction to metoprolol, other beta-blockers, medicines, foods, dyes, or preservatives  -pregnant or trying to get pregnant  -breast-feeding  How should I use this medicine?  Take this medicine by mouth with a drink of water. Follow the directions on the prescription label. Take this medicine immediately after meals. Take your doses at regular intervals. Do not take more medicine than directed. Do not stop taking this medicine suddenly. This could lead to serious heart-related effects.  Talk to your pediatrician regarding the use of this medicine in children. Special care may be needed.  Overdosage: If you think you have taken too much of this medicine contact a poison control center or emergency room at once.  NOTE: This medicine is only for you. Do not share this medicine with others.  What if I miss a dose?  If you miss a dose, take it as soon as you can. If it is almost time for your next dose, take only that dose. Do not take double or extra doses.  What may interact with this medicine?  This medicine may interact with the following medications:  -certain medicines for blood pressure, heart disease, irregular heart beat  -certain medicines for depression like  monoamine oxidase (MAO) inhibitors, fluoxetine, or paroxetine  -clonidine  -dobutamine  -epinephrine  -isoproterenol  -reserpine  This list may not describe all possible interactions. Give your health care provider a list of all the medicines, herbs, non-prescription drugs, or dietary supplements you use. Also tell them if you smoke, drink alcohol, or use illegal drugs. Some items may interact with your medicine.  What should I watch for while using this medicine?  Visit your doctor or health care professional for regular check ups. Contact your doctor right away if your symptoms worsen. Check your blood pressure and pulse rate regularly. Ask your health care professional what your blood pressure and pulse rate should be, and when you should contact them.  You may get drowsy or dizzy. Do not drive, use machinery, or do anything that needs mental alertness until you know how this medicine affects you. Do not sit or stand up quickly, especially if you are an older patient. This reduces the risk of dizzy or fainting spells. Contact your doctor if these symptoms continue. Alcohol may interfere with the effect of this medicine. Avoid alcoholic drinks.  What side effects may I notice from receiving this medicine?  Side effects that you should report to your doctor or health care professional as soon as possible:  -allergic reactions like skin rash, itching or hives  -cold or numb hands or feet  -depression  -difficulty breathing  -faint  -fever with sore throat  -irregular heartbeat, chest pain  -rapid weight gain  -swollen legs or ankles  Side effects that usually do not require medical attention (report to your doctor or health care professional if they continue or are bothersome):  -anxiety or nervousness  -change in sex drive or performance  -dry skin  -headache  -nightmares or trouble sleeping  -short term memory loss  -stomach upset or diarrhea  -unusually tired  This list may not describe all possible side effects.  Call your doctor for medical advice about side effects. You may report side effects to FDA at 4-850-VHD-3258.  Where should I keep my medicine?  Keep out of the reach of children.  Store at room temperature between 15 and 30 degrees C (59 and 86 degrees F). Throw away any unused medicine after the expiration date.  NOTE: This sheet is a summary. It may not cover all possible information. If you have questions about this medicine, talk to your doctor, pharmacist, or health care provider.  © 2018 [employer] (2014-08-22 14:40:36)    Prasugrel oral tablets  What is this medicine?  PRASUGREL (PRA tisha grel) helps to prevent blood clots. This medicine is used to prevent heart attack, stroke, or other vascular events in people who are at high risk.  This medicine may be used for other purposes; ask your health care provider or pharmacist if you have questions.  COMMON BRAND NAME(S): Effient  What should I tell my health care provider before I take this medicine?  They need to know if you have any of these conditions:  -bleeding disorders  -kidney disease  -liver disease  -recent trauma or surgery  -stomach or intestinal ulcers  -stroke or transient ischemic attack  -an unusual or allergic reaction to prasugrel, other medicines, foods, dyes, or preservatives  -pregnant or trying to get pregnant  -breast-feeding  How should I use this medicine?  Take this medicine by mouth with a drink of water. Follow the directions on the prescription label. You may take this medicine with or without food. If it upsets your stomach, take it with food. This medicine may be chewed or it may be crushed and put into food or liquids such as applesauce, juice, or water as long as it is taken immediately. This medicine has a bitter taste that you may notice if it is chewed or crushed. Take your medicine at regular intervals. Do not take your medicine more often than directed. Do not stop taking except on your doctor's advice.  Talk to  your pediatrician regarding the use of this medicine in children. Special care may be needed.  Overdosage: If you think you have taken too much of this medicine contact a poison control center or emergency room at once.  NOTE: This medicine is only for you. Do not share this medicine with others.  What if I miss a dose?  If you miss a dose, take it as soon as you can. If it is almost time for your next dose, take only that dose. Do not take double or extra doses.  What may interact with this medicine?  -aspirin  -certain medicines that treat or prevent blood clots like warfarin, enoxaparin, and dalteparin  -NSAIDS, medicines for pain and inflammation, like ibuprofen or naproxen  This list may not describe all possible interactions. Give your health care provider a list of all the medicines, herbs, non-prescription drugs, or dietary supplements you use. Also tell them if you smoke, drink alcohol, or use illegal drugs. Some items may interact with your medicine.  What should I watch for while using this medicine?  Visit your doctor or health care professional for regular check ups. Do not stop taking your medicine unless your doctor tells you to.  Notify your doctor or health care professional and seek emergency treatment if you develop breathing problems; changes in vision; chest pain; severe, sudden headache; pain, swelling, warmth in the leg; trouble speaking; sudden numbness or weakness of the face, arm, or leg. These can be signs that your condition has gotten worse.  If you are going to have surgery or dental work, tell your doctor or health care professional that you are taking this medicine.  What side effects may I notice from receiving this medicine?  Side effects that you should report to your doctor or health care professional as soon as possible:  -allergic reactions like skin rash, itching or hives, swelling of the face, lips, or tongue  -signs and symptoms of bleeding such as bloody or black, tarry  "stools; red or dark-brown urine; spitting up blood or brown material that looks like coffee grounds; red spots on the skin; unusual bruising or bleeding from the eye, gums, or nose  Side effects that usually do not require medical attention (report to your doctor or health care professional if they continue or are bothersome):  -diarrhea  -headache  -nausea, vomiting  -pain in back, arms or legs  This list may not describe all possible side effects. Call your doctor for medical advice about side effects. You may report side effects to FDA at 6-568-FDA-4220.  Where should I keep my medicine?  Keep out of the reach of children.  Store at room temperature between 15 and 30 degrees C (59 and 86 degrees F). Keep this medicine in the original container. Keep container closed and do not remove the gray cylinder from the bottle. Throw away any unused medicine after the expiration date.  NOTE: This sheet is a summary. It may not cover all possible information. If you have questions about this medicine, talk to your doctor, pharmacist, or health care provider.  © 2018 [employer] (2016-01-27 10:14:24)    DASH Eating Plan  DASH stands for \"Dietary Approaches to Stop Hypertension.\" The DASH eating plan is a healthy eating plan that has been shown to reduce high blood pressure (hypertension). Additional health benefits may include reducing the risk of type 2 diabetes mellitus, heart disease, and stroke. The DASH eating plan may also help with weight loss.  What do I need to know about the DASH eating plan?  For the DASH eating plan, you will follow these general guidelines:  · Choose foods with less than 150 milligrams of sodium per serving (as listed on the food label).  · Use salt-free seasonings or herbs instead of table salt or sea salt.  · Check with your health care provider or pharmacist before using salt substitutes.  · Eat lower-sodium products. These are often labeled as \"low-sodium\" or \"no salt " "added.\"  · Eat fresh foods. Avoid eating a lot of canned foods.  · Eat more vegetables, fruits, and low-fat dairy products.  · Choose whole grains. Look for the word \"whole\" as the first word in the ingredient list.  · Choose fish and skinless chicken or turkey more often than red meat. Limit fish, poultry, and meat to 6 oz (170 g) each day.  · Limit sweets, desserts, sugars, and sugary drinks.  · Choose heart-healthy fats.  · Eat more home-cooked food and less restaurant, buffet, and fast food.  · Limit fried foods.  · Do not brock foods. Cook foods using methods such as baking, boiling, grilling, and broiling instead.  · When eating at a restaurant, ask that your food be prepared with less salt, or no salt if possible.  What foods can I eat?  Seek help from a dietitian for individual calorie needs.  Grains   Whole grain or whole wheat bread. Brown rice. Whole grain or whole wheat pasta. Quinoa, bulgur, and whole grain cereals. Low-sodium cereals. Corn or whole wheat flour tortillas. Whole grain cornbread. Whole grain crackers. Low-sodium crackers.  Vegetables   Fresh or frozen vegetables (raw, steamed, roasted, or grilled). Low-sodium or reduced-sodium tomato and vegetable juices. Low-sodium or reduced-sodium tomato sauce and paste. Low-sodium or reduced-sodium canned vegetables.  Fruits   All fresh, canned (in natural juice), or frozen fruits.  Meat and Other Protein Products   Ground beef (85% or leaner), grass-fed beef, or beef trimmed of fat. Skinless chicken or turkey. Ground chicken or turkey. Pork trimmed of fat. All fish and seafood. Eggs. Dried beans, peas, or lentils. Unsalted nuts and seeds. Unsalted canned beans.  Dairy   Low-fat dairy products, such as skim or 1% milk, 2% or reduced-fat cheeses, low-fat ricotta or cottage cheese, or plain low-fat yogurt. Low-sodium or reduced-sodium cheeses.  Fats and Oils   Tub margarines without trans fats. Light or reduced-fat mayonnaise and salad dressings (reduced " sodium). Avocado. Safflower, olive, or canola oils. Natural peanut or almond butter.  Other   Unsalted popcorn and pretzels.  The items listed above may not be a complete list of recommended foods or beverages. Contact your dietitian for more options.   What foods are not recommended?  Grains   White bread. White pasta. White rice. Refined cornbread. Bagels and croissants. Crackers that contain trans fat.  Vegetables   Creamed or fried vegetables. Vegetables in a cheese sauce. Regular canned vegetables. Regular canned tomato sauce and paste. Regular tomato and vegetable juices.  Fruits   Canned fruit in light or heavy syrup. Fruit juice.  Meat and Other Protein Products   Fatty cuts of meat. Ribs, chicken wings, trevino, sausage, bologna, salami, chitterlings, fatback, hot dogs, bratwurst, and packaged luncheon meats. Salted nuts and seeds. Canned beans with salt.  Dairy   Whole or 2% milk, cream, half-and-half, and cream cheese. Whole-fat or sweetened yogurt. Full-fat cheeses or blue cheese. Nondairy creamers and whipped toppings. Processed cheese, cheese spreads, or cheese curds.  Condiments   Onion and garlic salt, seasoned salt, table salt, and sea salt. Canned and packaged gravies. Worcestershire sauce. Tartar sauce. Barbecue sauce. Teriyaki sauce. Soy sauce, including reduced sodium. Steak sauce. Fish sauce. Oyster sauce. Cocktail sauce. Horseradish. Ketchup and mustard. Meat flavorings and tenderizers. Bouillon cubes. Hot sauce. Tabasco sauce. Marinades. Taco seasonings. Relishes.  Fats and Oils   Butter, stick margarine, lard, shortening, ghee, and trevino fat. Coconut, palm kernel, or palm oils. Regular salad dressings.  Other   Pickles and olives. Salted popcorn and pretzels.  The items listed above may not be a complete list of foods and beverages to avoid. Contact your dietitian for more information.   Where can I find more information?  National Heart, Lung, and Blood [place]:  www.nhlbi.nih.gov/health/health-topics/topics/dash/  This information is not intended to replace advice given to you by your health care provider. Make sure you discuss any questions you have with your health care provider.  Document Released: 12/06/2012 Document Revised: 05/25/2017 Document Reviewed: 10/22/2014  [employer] Interactive Patient Education © 2017 [employer]    Depression / Suicide Risk    As you are discharged from this [facility] facility, it is important to learn how to keep safe from harming yourself.    Recognize the warning signs:  · Abrupt changes in personality, positive or negative- including increase in energy   · Giving away possessions  · Change in eating patterns- significant weight changes-  positive or negative  · Change in sleeping patterns- unable to sleep or sleeping all the time   · Unwillingness or inability to communicate  · Depression  · Unusual sadness, discouragement and loneliness  · Talk of wanting to die  · Neglect of personal appearance   · Rebelliousness- reckless behavior  · Withdrawal from people/activities they love  · Confusion- inability to concentrate     If you or a loved one observes any of these behaviors or has concerns about self-harm, here's what you can do:  · Talk about it- your feelings and reasons for harming yourself  · Remove any means that you might use to hurt yourself (examples: pills, rope, extension cords, firearm)  · Get professional help from the community (Mental Health, Substance Abuse, psychological counseling)  · Do not be alone:Call your Safe Contact- someone whom you trust who will be there for you.  · Call your local CRISIS HOTLINE 923-3106 or 890-864-1334  · Call your local Children's Mobile Crisis Response Team [place] (585) 903-5319 or www.[employer]  · Call the toll free National Suicide Prevention Hotlines   · National Suicide Prevention Lifeline 856-818-YHMK (4461)  · National Hope Line Network 800-SUICIDE  (734-7696)

## 2019-04-22 ENCOUNTER — OFFICE VISIT (OUTPATIENT)
Dept: CARDIOLOGY | Facility: MEDICAL CENTER | Age: 64
End: 2019-04-22
Payer: COMMERCIAL

## 2019-04-22 VITALS
WEIGHT: 225 LBS | HEART RATE: 64 BPM | OXYGEN SATURATION: 95 % | DIASTOLIC BLOOD PRESSURE: 82 MMHG | BODY MASS INDEX: 30.48 KG/M2 | HEIGHT: 72 IN | SYSTOLIC BLOOD PRESSURE: 122 MMHG

## 2019-04-22 DIAGNOSIS — E78.2 MIXED HYPERLIPIDEMIA: ICD-10-CM

## 2019-04-22 DIAGNOSIS — I21.4 NSTEMI (NON-ST ELEVATED MYOCARDIAL INFARCTION) (HCC): ICD-10-CM

## 2019-04-22 DIAGNOSIS — I25.10 CORONARY ARTERY DISEASE INVOLVING NATIVE CORONARY ARTERY OF NATIVE HEART WITHOUT ANGINA PECTORIS: ICD-10-CM

## 2019-04-22 PROBLEM — D72.829 LEUKOCYTOSIS: Status: RESOLVED | Noted: 2019-03-25 | Resolved: 2019-04-22

## 2019-04-22 PROBLEM — E78.5 HYPERLIPIDEMIA: Status: ACTIVE | Noted: 2019-04-22

## 2019-04-22 PROBLEM — E87.6 HYPOKALEMIA: Status: RESOLVED | Noted: 2019-03-25 | Resolved: 2019-04-22

## 2019-04-22 PROCEDURE — 99214 OFFICE O/P EST MOD 30 MIN: CPT | Performed by: NURSE PRACTITIONER

## 2019-04-22 RX ORDER — ATORVASTATIN CALCIUM 80 MG/1
80 TABLET, FILM COATED ORAL EVERY EVENING
Qty: 90 TAB | Refills: 3 | Status: SHIPPED | OUTPATIENT
Start: 2019-04-22 | End: 2019-09-24 | Stop reason: SDUPTHER

## 2019-04-22 RX ORDER — PRASUGREL 10 MG/1
10 TABLET, FILM COATED ORAL DAILY
Qty: 90 TAB | Refills: 3 | Status: SHIPPED | OUTPATIENT
Start: 2019-04-22 | End: 2019-09-24 | Stop reason: SDUPTHER

## 2019-04-22 RX ORDER — ASPIRIN 81 MG/1
81 TABLET, CHEWABLE ORAL DAILY
Qty: 100 TAB | Refills: 3 | Status: SHIPPED | OUTPATIENT
Start: 2019-04-22

## 2019-04-22 ASSESSMENT — ENCOUNTER SYMPTOMS
COUGH: 0
NAUSEA: 0
BRUISES/BLEEDS EASILY: 0
MYALGIAS: 0
CHILLS: 0
PND: 0
PALPITATIONS: 0
SHORTNESS OF BREATH: 0
LOSS OF CONSCIOUSNESS: 0
DIZZINESS: 0
INSOMNIA: 0
HEADACHES: 0
FEVER: 0
ABDOMINAL PAIN: 0
ORTHOPNEA: 0

## 2019-04-22 NOTE — PROGRESS NOTES
Chief Complaint   Patient presents with   • Hospital Follow-up   • MI (Non ST Segment Elevation MI)   • Coronary Artery Disease   • Hyperlipidemia       Subjective:   Niall Lane is a 63 y.o. male who presents today for hospital follow-up of NSTEMI and PCI/MITUL to the LAD,    Niall is a 63 year old male with history of diet controlled diabetes. He presented to [facility] on 3/24/2019 with chest pain/pressure. He did have elevated troponin levels, and was taken to the cath lab. Coronary angiogram revealed stenosis of the mid LAD, treated with angioplasty and drug eluting stent. Medical therapy was adjusted with addition of ASA, Effient, BB and statin.    He is here today for follow-up. He is feeling much better. No further chest pain, pressure or discomfort. He has been biking and hiking without any problems/symptoms. No palpitations; no shortness of breath, orthopnea or PND; no dizziness or syncope; no LE edema. He is compliant with his medications. He quit smoking 20 years ago; he quit drinking alcohol 1-2 years ago.    Past Medical History:   Diagnosis Date   • CAD (coronary artery disease) 03/25/2019    NSTEMI. PCI/MITUL (Synergy 4.0 x 38mm) to the mid LAD.   • Fracture    • Hyperlipidemia    • NSTEMI (non-ST elevated myocardial infarction) (HCC)      History reviewed. No pertinent surgical history.  Family History   Problem Relation Age of Onset   • Heart Disease Mother      Social History     Social History   • Marital status:      Spouse name: N/A   • Number of children: N/A   • Years of education: N/A     Occupational History   • Not on file.     Social History Main Topics   • Smoking status: Light Tobacco Smoker     Types: Cigars   • Smokeless tobacco: Never Used   • Alcohol use No   • Drug use: Yes     Types: Inhaled      Comment: marijuana   • Sexual activity: Not on file     Other Topics Concern   • Not on file     Social History Narrative   • No narrative on file     No Known Allergies  Outpatient  Encounter Prescriptions as of 4/22/2019   Medication Sig Dispense Refill   • aspirin (ASA) 81 MG Chew Tab chewable tablet Take 1 Tab by mouth every day. 100 Tab 3   • atorvastatin (LIPITOR) 80 MG tablet Take 1 Tab by mouth every evening. 90 Tab 3   • metoprolol (LOPRESSOR) 25 MG Tab Take 1 Tab by mouth 2 Times a Day. 180 Tab 3   • prasugrel (EFFIENT) 10 MG Tab Take 1 Tab by mouth every day. 90 Tab 3   • [DISCONTINUED] metoprolol (LOPRESSOR) 25 MG Tab TAKE 1 TAB BY MOUTH 2 TIMES A DAY  0   • [DISCONTINUED] aspirin (ASA) 81 MG Chew Tab chewable tablet Take 1 Tab by mouth every day. 100 Tab 0   • [DISCONTINUED] atorvastatin (LIPITOR) 80 MG tablet Take 1 Tab by mouth every evening. 30 Tab 0   • [DISCONTINUED] metoprolol (LOPRESSOR) 25 MG Tab Take 1 Tab by mouth 2 Times a Day. 60 Tab 0   • [DISCONTINUED] prasugrel (EFFIENT) 10 MG Tab Take 1 Tab by mouth every day. 30 Tab 0     No facility-administered encounter medications on file as of 4/22/2019.      Review of Systems   Constitutional: Negative for chills and fever.   HENT: Negative for congestion.    Respiratory: Negative for cough and shortness of breath.    Cardiovascular: Negative for chest pain, palpitations, orthopnea, leg swelling and PND.   Gastrointestinal: Negative for abdominal pain and nausea.   Musculoskeletal: Negative for myalgias.   Skin: Negative for rash.   Neurological: Negative for dizziness, loss of consciousness and headaches.   Endo/Heme/Allergies: Does not bruise/bleed easily.   Psychiatric/Behavioral: The patient does not have insomnia.         Objective:   /82 (BP Location: Left arm, Patient Position: Sitting, BP Cuff Size: Adult)   Pulse 64   Ht 1.829 m (6')   Wt 102.1 kg (225 lb)   SpO2 95%   BMI 30.52 kg/m²     Physical Exam   Constitutional: He is oriented to person, place, and time. He appears well-developed and well-nourished.   He is overweight (BMI 30.52)   HENT:   Head: Normocephalic.   Eyes: EOM are normal.   Neck: Normal  range of motion. Neck supple. No JVD present.   Cardiovascular: Normal rate, regular rhythm and normal heart sounds.    Pulmonary/Chest: Effort normal and breath sounds normal. No respiratory distress. He has no wheezes. He has no rales.   Abdominal: Soft. Bowel sounds are normal. He exhibits no distension. There is no tenderness.   Musculoskeletal: Normal range of motion. He exhibits no edema.   Neurological: He is alert and oriented to person, place, and time.   Skin: Skin is warm and dry. No rash noted.   Psychiatric: He has a normal mood and affect.     RESULTS OF CORONARY ANGIOGRAM OF 3/25/2019:  PROCEDURES:  Cardiac catheterization and percutaneous coronary intervention.  A.  Left heart catheterization.  B.  Left ventriculography.  C.  Selective coronary angiography.  D.  Coronary stent implantation, mid left anterior descending   artery, 4.0x38 mm Synergy drug-eluting stent post-dilated 4.5 mm.  E.  Right radial artery approach.  F.  Conscious sedation supervision.    CONCLUSIONS OF ECHOCARDIOGRAM OF 3/25/2019:  No prior study is available for comparison.   Normal left ventricular systolic function.  Left ventricular ejection fraction is visually estimated to be 55%.  There is apical anterior, apical septal apical inferior, and apical   akinesis.  Mid anterior and mid anteroseptal hypokinesis.  Normal diastolic function.  The right ventricle was normal in size and function.  Mild mitral regurgitation.  Moderate tricuspid regurgitation.    Lab Results   Component Value Date/Time    CHOLSTRLTOT 145 03/25/2019 02:25 AM    LDL 82 03/25/2019 02:25 AM    HDL 31 (A) 03/25/2019 02:25 AM    TRIGLYCERIDE 159 (H) 03/25/2019 02:25 AM       Lab Results   Component Value Date/Time    SODIUM 138 03/25/2019 08:32 AM    POTASSIUM 4.1 03/25/2019 08:32 AM    CHLORIDE 110 03/25/2019 08:32 AM    CO2 22 03/25/2019 08:32 AM    GLUCOSE 111 (H) 03/25/2019 08:32 AM    BUN 13 03/25/2019 08:32 AM    CREATININE 0.71 03/25/2019 08:32 AM      Lab Results   Component Value Date/Time    ALKPHOSPHAT 44 03/25/2019 08:32 AM    ASTSGOT 25 03/25/2019 08:32 AM    ALTSGPT 13 03/25/2019 08:32 AM    TBILIRUBIN 0.8 03/25/2019 08:32 AM        Assessment:     1. NSTEMI (non-ST elevated myocardial infarction) (HCC)  metoprolol (LOPRESSOR) 25 MG Tab   2. Coronary artery disease involving native coronary artery of native heart without angina pectoris  aspirin (ASA) 81 MG Chew Tab chewable tablet    prasugrel (EFFIENT) 10 MG Tab   3. Mixed hyperlipidemia  atorvastatin (LIPITOR) 80 MG tablet       Medical Decision Making:  Today's Assessment / Status / Plan:     1. Recent hospitalization for NSTEMI, with PCI/MITUL to the mid LAD. He is doing well. He lives in [facility], and would prefer to exercise up there. Discussed exercise guidelines and limitations. Also discussed medication compliance, and the importance of taking Effient daily x 12 months. Also discussed dietary/lifestyle modifications.    2. Hyperlipidemia, treated with Lipitor.    Same medications for now, which are renewed for a year. To FU in 3 months with Dr. Valencia, whom he saw in the hospital, in 3 months. FU sooner if clinical condition changes.    Collaborating MD: Pushpa

## 2019-04-22 NOTE — LETTER
[facility]   [address]   [address]  [place] [address]  Phone: [phone]  Fax: [phone]              Niall aLne  1955    Encounter Date: 4/22/2019    JAYME Christianson          PROGRESS NOTE:  Chief Complaint   Patient presents with   • Hospital Follow-up   • MI (Non ST Segment Elevation MI)   • Coronary Artery Disease   • Hyperlipidemia       Subjective:   Niall Lane is a 63 y.o. male who presents today for hospital follow-up of NSTEMI and PCI/MITUL to the LAD,    Niall is a 63 year old male with history of diet controlled diabetes. He presented to [facility] on 3/24/2019 with chest pain/pressure. He did have elevated troponin levels, and was taken to the cath lab. Coronary angiogram revealed stenosis of the mid LAD, treated with angioplasty and drug eluting stent. Medical therapy was adjusted with addition of ASA, Effient, BB and statin.    He is here today for follow-up. He is feeling much better. No further chest pain, pressure or discomfort. He has been biking and hiking without any problems/symptoms. No palpitations; no shortness of breath, orthopnea or PND; no dizziness or syncope; no LE edema. He is compliant with his medications. He quit smoking 20 years ago; he quit drinking alcohol 1-2 years ago.    Past Medical History:   Diagnosis Date   • CAD (coronary artery disease) 03/25/2019    NSTEMI. PCI/MITUL (Synergy 4.0 x 38mm) to the mid LAD.   • Fracture    • Hyperlipidemia    • NSTEMI (non-ST elevated myocardial infarction) (HCC)      History reviewed. No pertinent surgical history.  Family History   Problem Relation Age of Onset   • Heart Disease Mother      Social History     Social History   • Marital status:      Spouse name: N/A   • Number of children: N/A   • Years of education: N/A     Occupational History   • Not on file.     Social History Main Topics   • Smoking status: Light Tobacco Smoker     Types: Cigars      • Smokeless tobacco: Never Used   • Alcohol use No   • Drug use: Yes     Types: Inhaled      Comment: marijuana   • Sexual activity: Not on file     Other Topics Concern   • Not on file     Social History Narrative   • No narrative on file     No Known Allergies  Outpatient Encounter Prescriptions as of 4/22/2019   Medication Sig Dispense Refill   • aspirin (ASA) 81 MG Chew Tab chewable tablet Take 1 Tab by mouth every day. 100 Tab 3   • atorvastatin (LIPITOR) 80 MG tablet Take 1 Tab by mouth every evening. 90 Tab 3   • metoprolol (LOPRESSOR) 25 MG Tab Take 1 Tab by mouth 2 Times a Day. 180 Tab 3   • prasugrel (EFFIENT) 10 MG Tab Take 1 Tab by mouth every day. 90 Tab 3   • [DISCONTINUED] metoprolol (LOPRESSOR) 25 MG Tab TAKE 1 TAB BY MOUTH 2 TIMES A DAY  0   • [DISCONTINUED] aspirin (ASA) 81 MG Chew Tab chewable tablet Take 1 Tab by mouth every day. 100 Tab 0   • [DISCONTINUED] atorvastatin (LIPITOR) 80 MG tablet Take 1 Tab by mouth every evening. 30 Tab 0   • [DISCONTINUED] metoprolol (LOPRESSOR) 25 MG Tab Take 1 Tab by mouth 2 Times a Day. 60 Tab 0   • [DISCONTINUED] prasugrel (EFFIENT) 10 MG Tab Take 1 Tab by mouth every day. 30 Tab 0     No facility-administered encounter medications on file as of 4/22/2019.      Review of Systems   Constitutional: Negative for chills and fever.   HENT: Negative for congestion.    Respiratory: Negative for cough and shortness of breath.    Cardiovascular: Negative for chest pain, palpitations, orthopnea, leg swelling and PND.   Gastrointestinal: Negative for abdominal pain and nausea.   Musculoskeletal: Negative for myalgias.   Skin: Negative for rash.   Neurological: Negative for dizziness, loss of consciousness and headaches.   Endo/Heme/Allergies: Does not bruise/bleed easily.   Psychiatric/Behavioral: The patient does not have insomnia.         Objective:   /82 (BP Location: Left arm, Patient Position: Sitting, BP Cuff Size: Adult)   Pulse 64   Ht 1.829 m (6')    Wt 102.1 kg (225 lb)   SpO2 95%   BMI 30.52 kg/m²      Physical Exam   Constitutional: He is oriented to person, place, and time. He appears well-developed and well-nourished.   He is overweight (BMI 30.52)   HENT:   Head: Normocephalic.   Eyes: EOM are normal.   Neck: Normal range of motion. Neck supple. No JVD present.   Cardiovascular: Normal rate, regular rhythm and normal heart sounds.    Pulmonary/Chest: Effort normal and breath sounds normal. No respiratory distress. He has no wheezes. He has no rales.   Abdominal: Soft. Bowel sounds are normal. He exhibits no distension. There is no tenderness.   Musculoskeletal: Normal range of motion. He exhibits no edema.   Neurological: He is alert and oriented to person, place, and time.   Skin: Skin is warm and dry. No rash noted.   Psychiatric: He has a normal mood and affect.     RESULTS OF CORONARY ANGIOGRAM OF 3/25/2019:  PROCEDURES:  Cardiac catheterization and percutaneous coronary intervention.  A.  Left heart catheterization.  B.  Left ventriculography.  C.  Selective coronary angiography.  D.  Coronary stent implantation, mid left anterior descending   artery, 4.0x38 mm Synergy drug-eluting stent post-dilated 4.5 mm.  E.  Right radial artery approach.  F.  Conscious sedation supervision.    CONCLUSIONS OF ECHOCARDIOGRAM OF 3/25/2019:  No prior study is available for comparison.   Normal left ventricular systolic function.  Left ventricular ejection fraction is visually estimated to be 55%.  There is apical anterior, apical septal apical inferior, and apical   akinesis.  Mid anterior and mid anteroseptal hypokinesis.  Normal diastolic function.  The right ventricle was normal in size and function.  Mild mitral regurgitation.  Moderate tricuspid regurgitation.    Lab Results   Component Value Date/Time    CHOLSTRLTOT 145 03/25/2019 02:25 AM    LDL 82 03/25/2019 02:25 AM    HDL 31 (A) 03/25/2019 02:25 AM    TRIGLYCERIDE 159 (H) 03/25/2019 02:25 AM       Lab  Results   Component Value Date/Time    SODIUM 138 03/25/2019 08:32 AM    POTASSIUM 4.1 03/25/2019 08:32 AM    CHLORIDE 110 03/25/2019 08:32 AM    CO2 22 03/25/2019 08:32 AM    GLUCOSE 111 (H) 03/25/2019 08:32 AM    BUN 13 03/25/2019 08:32 AM    CREATININE 0.71 03/25/2019 08:32 AM     Lab Results   Component Value Date/Time    ALKPHOSPHAT 44 03/25/2019 08:32 AM    ASTSGOT 25 03/25/2019 08:32 AM    ALTSGPT 13 03/25/2019 08:32 AM    TBILIRUBIN 0.8 03/25/2019 08:32 AM        Assessment:     1. NSTEMI (non-ST elevated myocardial infarction) (HCC)  metoprolol (LOPRESSOR) 25 MG Tab   2. Coronary artery disease involving native coronary artery of native heart without angina pectoris  aspirin (ASA) 81 MG Chew Tab chewable tablet    prasugrel (EFFIENT) 10 MG Tab   3. Mixed hyperlipidemia  atorvastatin (LIPITOR) 80 MG tablet       Medical Decision Making:  Today's Assessment / Status / Plan:     1. Recent hospitalization for NSTEMI, with PCI/MITUL to the mid LAD. He is doing well. He lives in [facility], and would prefer to exercise up there. Discussed exercise guidelines and limitations. Also discussed medication compliance, and the importance of taking Effient daily x 12 months. Also discussed dietary/lifestyle modifications.    2. Hyperlipidemia, treated with Lipitor.    Same medications for now, which are renewed for a year. To FU in 3 months with Dr. Valencia, whom he saw in the hospital, in 3 months. FU sooner if clinical condition changes.    Collaborating MD: Pushpa Michelle

## 2019-07-02 ENCOUNTER — OFFICE VISIT (OUTPATIENT)
Dept: CARDIOLOGY | Facility: MEDICAL CENTER | Age: 64
End: 2019-07-02
Payer: COMMERCIAL

## 2019-07-02 VITALS
OXYGEN SATURATION: 93 % | DIASTOLIC BLOOD PRESSURE: 80 MMHG | BODY MASS INDEX: 31.02 KG/M2 | SYSTOLIC BLOOD PRESSURE: 118 MMHG | WEIGHT: 229 LBS | HEIGHT: 72 IN | HEART RATE: 68 BPM

## 2019-07-02 DIAGNOSIS — I25.10 CORONARY ARTERY DISEASE INVOLVING NATIVE CORONARY ARTERY OF NATIVE HEART WITHOUT ANGINA PECTORIS: ICD-10-CM

## 2019-07-02 DIAGNOSIS — I10 HTN (HYPERTENSION), MALIGNANT: ICD-10-CM

## 2019-07-02 DIAGNOSIS — Z95.5 STENTED CORONARY ARTERY: ICD-10-CM

## 2019-07-02 DIAGNOSIS — Z79.899 HIGH RISK MEDICATION USE: ICD-10-CM

## 2019-07-02 DIAGNOSIS — E78.2 MIXED HYPERLIPIDEMIA: ICD-10-CM

## 2019-07-02 PROCEDURE — 99214 OFFICE O/P EST MOD 30 MIN: CPT | Performed by: INTERNAL MEDICINE

## 2019-07-02 RX ORDER — LISINOPRIL 10 MG/1
10 TABLET ORAL DAILY
Qty: 30 TAB | Refills: 11 | Status: SHIPPED | OUTPATIENT
Start: 2019-07-02 | End: 2019-09-24 | Stop reason: SDUPTHER

## 2019-07-02 ASSESSMENT — ENCOUNTER SYMPTOMS
EYE DISCHARGE: 0
BRUISES/BLEEDS EASILY: 0
BLURRED VISION: 0
CLAUDICATION: 0
FEVER: 0
DOUBLE VISION: 0
ABDOMINAL PAIN: 0
COUGH: 0
NAUSEA: 0
SENSORY CHANGE: 0
SHORTNESS OF BREATH: 0
PND: 0
HEADACHES: 0
SPEECH CHANGE: 0
MYALGIAS: 0
DEPRESSION: 0
BLOOD IN STOOL: 0
ORTHOPNEA: 0
PALPITATIONS: 0
DIZZINESS: 0
CHILLS: 0
EYE PAIN: 0
WEIGHT LOSS: 0
HALLUCINATIONS: 0
FALLS: 0
VOMITING: 0
LOSS OF CONSCIOUSNESS: 0

## 2019-07-02 NOTE — LETTER
[facility]   [address]   [address]  [place] [address]  Phone: [phone]  Fax: [phone]              Niall Lane  1955    Encounter Date: 7/2/2019    Rox Valencia M.D.          PROGRESS NOTE:  Chief Complaint   Patient presents with   • MI (Non ST Segment Elevation MI)       Subjective:   Niall Lane is a 64 y.o. male who presents today for cardiac care due to CAD s/p LAD stent due to NSTEMI 03/2019, HTN, HLP.    I have independently interpreted and reviewed blood tests results with patient in clinic which shows normal LDL level, triglycerides, renal and liver function.      Past Medical History:   Diagnosis Date   • CAD (coronary artery disease) 03/25/2019    NSTEMI. PCI/MITUL (Synergy 4.0 x 38mm) to the mid LAD.   • Fracture    • Hyperlipidemia    • NSTEMI (non-ST elevated myocardial infarction) (HCC)      No past surgical history on file.  Family History   Problem Relation Age of Onset   • Heart Disease Mother      Social History     Social History   • Marital status:      Spouse name: N/A   • Number of children: N/A   • Years of education: N/A     Occupational History   • Not on file.     Social History Main Topics   • Smoking status: Light Tobacco Smoker     Types: Cigars   • Smokeless tobacco: Never Used   • Alcohol use No   • Drug use: Yes     Types: Inhaled      Comment: marijuana   • Sexual activity: Not on file     Other Topics Concern   • Not on file     Social History Narrative   • No narrative on file     No Known Allergies  Outpatient Encounter Prescriptions as of 7/2/2019   Medication Sig Dispense Refill   • lisinopril (PRINIVIL) 10 MG Tab Take 1 Tab by mouth every day. 30 Tab 11   • aspirin (ASA) 81 MG Chew Tab chewable tablet Take 1 Tab by mouth every day. 100 Tab 3   • atorvastatin (LIPITOR) 80 MG tablet Take 1 Tab by mouth every evening. 90 Tab 3   • metoprolol (LOPRESSOR) 25 MG Tab Take 1 Tab by  mouth 2 Times a Day. 180 Tab 3   • prasugrel (EFFIENT) 10 MG Tab Take 1 Tab by mouth every day. 90 Tab 3     No facility-administered encounter medications on file as of 7/2/2019.      Review of Systems   Constitutional: Negative for chills, fever, malaise/fatigue and weight loss.   HENT: Negative for ear discharge, ear pain, hearing loss and nosebleeds.    Eyes: Negative for blurred vision, double vision, pain and discharge.   Respiratory: Negative for cough and shortness of breath.    Cardiovascular: Negative for chest pain, palpitations, orthopnea, claudication, leg swelling and PND.   Gastrointestinal: Negative for abdominal pain, blood in stool, melena, nausea and vomiting.   Genitourinary: Negative for dysuria and hematuria.   Musculoskeletal: Negative for falls, joint pain and myalgias.   Skin: Negative for itching and rash.   Neurological: Negative for dizziness, sensory change, speech change, loss of consciousness and headaches.   Endo/Heme/Allergies: Negative for environmental allergies. Does not bruise/bleed easily.   Psychiatric/Behavioral: Negative for depression, hallucinations and suicidal ideas.        Objective:   /80 (BP Location: Left arm, Patient Position: Sitting, BP Cuff Size: Adult)   Pulse 68   Ht 1.829 m (6')   Wt 103.9 kg (229 lb)   SpO2 93%   BMI 31.06 kg/m²      Physical Exam   Constitutional: He is oriented to person, place, and time. No distress.   HENT:   Head: Normocephalic and atraumatic.   Right Ear: External ear normal.   Left Ear: External ear normal.   Eyes: Right eye exhibits no discharge. Left eye exhibits no discharge.   Neck: No JVD present. No thyromegaly present.   Cardiovascular: Normal rate, regular rhythm, normal heart sounds and intact distal pulses.  Exam reveals no gallop and no friction rub.    No murmur heard.  Pulmonary/Chest: Breath sounds normal. No respiratory distress.   Abdominal: Bowel sounds are normal. He exhibits no distension. There is no  tenderness.   Musculoskeletal: He exhibits no edema or tenderness.   Neurological: He is alert and oriented to person, place, and time. No cranial nerve deficit.   Skin: Skin is warm and dry. He is not diaphoretic.   Psychiatric: He has a normal mood and affect. His behavior is normal.   Nursing note and vitals reviewed.      Assessment:     1. Coronary artery disease involving native coronary artery of native heart without angina pectoris  lisinopril (PRINIVIL) 10 MG Tab    Comp Metabolic Panel    LIPID PANEL   2. Mixed hyperlipidemia  lisinopril (PRINIVIL) 10 MG Tab    Comp Metabolic Panel    LIPID PANEL   3. Stented coronary artery  lisinopril (PRINIVIL) 10 MG Tab    Comp Metabolic Panel    LIPID PANEL   4. HTN (hypertension), malignant  lisinopril (PRINIVIL) 10 MG Tab   5. High risk medication use         Medical Decision Making:  Today's Assessment / Status / Plan:   CAD s/p PCI and stent of LAD:  Betablocker as Metoprolol will be kept at 25 mg po 2x daily.  Dual antiplatelet therapy with ASA 81 mg po daily and Presugrel 10mg 1x daily.  Will continue Atorvastatin 80 mg po daily  Will start Lisinopril 10 mg po daily.    Hypertension:  Optimize control with BB, ACE-I as permitted.    Hyperlipidemia:  Optimize statin as within guidelines of CAD treatment as above.    I will see patient back in clinic with lab tests and studies results in 3 months.    I thank you Dr. Liu for referring patient to our Cardiology Clinic today.        Bertin Liu M.D.  [address]  [address]  [facility] [address]  VIA Facsimile: [phone]

## 2019-07-02 NOTE — PROGRESS NOTES
Chief Complaint   Patient presents with   • MI (Non ST Segment Elevation MI)       Subjective:   Niall Lane is a 64 y.o. male who presents today for cardiac care due to CAD s/p LAD stent due to NSTEMI 03/2019, HTN, HLP.    I have independently interpreted and reviewed blood tests results with patient in clinic which shows normal LDL level, triglycerides, renal and liver function.      Past Medical History:   Diagnosis Date   • CAD (coronary artery disease) 03/25/2019    NSTEMI. PCI/MITUL (Synergy 4.0 x 38mm) to the mid LAD.   • Fracture    • Hyperlipidemia    • NSTEMI (non-ST elevated myocardial infarction) (HCC)      No past surgical history on file.  Family History   Problem Relation Age of Onset   • Heart Disease Mother      Social History     Social History   • Marital status:      Spouse name: N/A   • Number of children: N/A   • Years of education: N/A     Occupational History   • Not on file.     Social History Main Topics   • Smoking status: Light Tobacco Smoker     Types: Cigars   • Smokeless tobacco: Never Used   • Alcohol use No   • Drug use: Yes     Types: Inhaled      Comment: marijuana   • Sexual activity: Not on file     Other Topics Concern   • Not on file     Social History Narrative   • No narrative on file     No Known Allergies  Outpatient Encounter Prescriptions as of 7/2/2019   Medication Sig Dispense Refill   • lisinopril (PRINIVIL) 10 MG Tab Take 1 Tab by mouth every day. 30 Tab 11   • aspirin (ASA) 81 MG Chew Tab chewable tablet Take 1 Tab by mouth every day. 100 Tab 3   • atorvastatin (LIPITOR) 80 MG tablet Take 1 Tab by mouth every evening. 90 Tab 3   • metoprolol (LOPRESSOR) 25 MG Tab Take 1 Tab by mouth 2 Times a Day. 180 Tab 3   • prasugrel (EFFIENT) 10 MG Tab Take 1 Tab by mouth every day. 90 Tab 3     No facility-administered encounter medications on file as of 7/2/2019.      Review of Systems   Constitutional: Negative for chills, fever, malaise/fatigue and weight loss.    HENT: Negative for ear discharge, ear pain, hearing loss and nosebleeds.    Eyes: Negative for blurred vision, double vision, pain and discharge.   Respiratory: Negative for cough and shortness of breath.    Cardiovascular: Negative for chest pain, palpitations, orthopnea, claudication, leg swelling and PND.   Gastrointestinal: Negative for abdominal pain, blood in stool, melena, nausea and vomiting.   Genitourinary: Negative for dysuria and hematuria.   Musculoskeletal: Negative for falls, joint pain and myalgias.   Skin: Negative for itching and rash.   Neurological: Negative for dizziness, sensory change, speech change, loss of consciousness and headaches.   Endo/Heme/Allergies: Negative for environmental allergies. Does not bruise/bleed easily.   Psychiatric/Behavioral: Negative for depression, hallucinations and suicidal ideas.        Objective:   /80 (BP Location: Left arm, Patient Position: Sitting, BP Cuff Size: Adult)   Pulse 68   Ht 1.829 m (6')   Wt 103.9 kg (229 lb)   SpO2 93%   BMI 31.06 kg/m²     Physical Exam   Constitutional: He is oriented to person, place, and time. No distress.   HENT:   Head: Normocephalic and atraumatic.   Right Ear: External ear normal.   Left Ear: External ear normal.   Eyes: Right eye exhibits no discharge. Left eye exhibits no discharge.   Neck: No JVD present. No thyromegaly present.   Cardiovascular: Normal rate, regular rhythm, normal heart sounds and intact distal pulses.  Exam reveals no gallop and no friction rub.    No murmur heard.  Pulmonary/Chest: Breath sounds normal. No respiratory distress.   Abdominal: Bowel sounds are normal. He exhibits no distension. There is no tenderness.   Musculoskeletal: He exhibits no edema or tenderness.   Neurological: He is alert and oriented to person, place, and time. No cranial nerve deficit.   Skin: Skin is warm and dry. He is not diaphoretic.   Psychiatric: He has a normal mood and affect. His behavior is normal.    Nursing note and vitals reviewed.      Assessment:     1. Coronary artery disease involving native coronary artery of native heart without angina pectoris  lisinopril (PRINIVIL) 10 MG Tab    Comp Metabolic Panel    LIPID PANEL   2. Mixed hyperlipidemia  lisinopril (PRINIVIL) 10 MG Tab    Comp Metabolic Panel    LIPID PANEL   3. Stented coronary artery  lisinopril (PRINIVIL) 10 MG Tab    Comp Metabolic Panel    LIPID PANEL   4. HTN (hypertension), malignant  lisinopril (PRINIVIL) 10 MG Tab   5. High risk medication use         Medical Decision Making:  Today's Assessment / Status / Plan:   CAD s/p PCI and stent of LAD:  Betablocker as Metoprolol will be kept at 25 mg po 2x daily.  Dual antiplatelet therapy with ASA 81 mg po daily and Presugrel 10mg 1x daily.  Will continue Atorvastatin 80 mg po daily  Will start Lisinopril 10 mg po daily.    Hypertension:  Optimize control with BB, ACE-I as permitted.    Hyperlipidemia:  Optimize statin as within guidelines of CAD treatment as above.    I will see patient back in clinic with lab tests and studies results in 3 months.    I thank you Dr. Liu for referring patient to our Cardiology Clinic today.

## 2019-09-24 ENCOUNTER — OFFICE VISIT (OUTPATIENT)
Dept: CARDIOLOGY | Facility: MEDICAL CENTER | Age: 64
End: 2019-09-24
Payer: COMMERCIAL

## 2019-09-24 VITALS
HEART RATE: 76 BPM | SYSTOLIC BLOOD PRESSURE: 110 MMHG | BODY MASS INDEX: 30.75 KG/M2 | HEIGHT: 72 IN | WEIGHT: 227 LBS | DIASTOLIC BLOOD PRESSURE: 66 MMHG | OXYGEN SATURATION: 94 %

## 2019-09-24 DIAGNOSIS — I21.4 NSTEMI (NON-ST ELEVATED MYOCARDIAL INFARCTION) (HCC): ICD-10-CM

## 2019-09-24 DIAGNOSIS — Z79.899 HIGH RISK MEDICATION USE: ICD-10-CM

## 2019-09-24 DIAGNOSIS — E78.2 MIXED HYPERLIPIDEMIA: ICD-10-CM

## 2019-09-24 DIAGNOSIS — I25.10 CORONARY ARTERY DISEASE INVOLVING NATIVE CORONARY ARTERY OF NATIVE HEART WITHOUT ANGINA PECTORIS: ICD-10-CM

## 2019-09-24 DIAGNOSIS — I10 HTN (HYPERTENSION), MALIGNANT: ICD-10-CM

## 2019-09-24 DIAGNOSIS — Z95.5 STENTED CORONARY ARTERY: ICD-10-CM

## 2019-09-24 PROCEDURE — 99214 OFFICE O/P EST MOD 30 MIN: CPT | Performed by: INTERNAL MEDICINE

## 2019-09-24 RX ORDER — LISINOPRIL 10 MG/1
10 TABLET ORAL DAILY
Qty: 90 TAB | Refills: 3 | Status: SHIPPED | OUTPATIENT
Start: 2019-09-24 | End: 2020-08-17 | Stop reason: SDUPTHER

## 2019-09-24 RX ORDER — PRASUGREL 10 MG/1
10 TABLET, FILM COATED ORAL DAILY
Qty: 90 TAB | Refills: 3 | Status: SHIPPED | OUTPATIENT
Start: 2019-09-24 | End: 2020-08-17 | Stop reason: SDUPTHER

## 2019-09-24 RX ORDER — ATORVASTATIN CALCIUM 80 MG/1
80 TABLET, FILM COATED ORAL EVERY EVENING
Qty: 90 TAB | Refills: 3 | Status: SHIPPED | OUTPATIENT
Start: 2019-09-24 | End: 2020-08-17 | Stop reason: SDUPTHER

## 2019-09-24 ASSESSMENT — ENCOUNTER SYMPTOMS
PND: 0
ABDOMINAL PAIN: 0
EYE PAIN: 0
MYALGIAS: 0
CHILLS: 0
ORTHOPNEA: 0
HALLUCINATIONS: 0
EYE DISCHARGE: 0
SHORTNESS OF BREATH: 0
DOUBLE VISION: 0
BLURRED VISION: 0
CLAUDICATION: 0
SPEECH CHANGE: 0
PALPITATIONS: 0
COUGH: 0
DIZZINESS: 0
SENSORY CHANGE: 0
BRUISES/BLEEDS EASILY: 0
LOSS OF CONSCIOUSNESS: 0
DEPRESSION: 0
NAUSEA: 0
FEVER: 0
BLOOD IN STOOL: 0
FALLS: 0
VOMITING: 0
HEADACHES: 0
WEIGHT LOSS: 0

## 2019-09-24 NOTE — PROGRESS NOTES
Chief Complaint   Patient presents with   • Coronary Artery Disease       Subjective:   Niall Lane is a 64 y.o. male who presents today for cardiac care due to CAD s/p LAD stent due to NSTEMI 03/2019, HTN, HLP.     I have independently interpreted and reviewed blood tests results with patient in clinic which shows normal LDL level 82, triglycerides, renal and liver function.    Past Medical History:   Diagnosis Date   • CAD (coronary artery disease) 03/25/2019    NSTEMI. PCI/MITUL (Synergy 4.0 x 38mm) to the mid LAD.   • Fracture    • Hyperlipidemia    • NSTEMI (non-ST elevated myocardial infarction) (HCC)      History reviewed. No pertinent surgical history.  Family History   Problem Relation Age of Onset   • Heart Disease Mother      Social History     Socioeconomic History   • Marital status:      Spouse name: Not on file   • Number of children: Not on file   • Years of education: Not on file   • Highest education level: Not on file   Occupational History   • Not on file   Social Needs   • Financial resource strain: Not on file   • Food insecurity:     Worry: Not on file     Inability: Not on file   • Transportation needs:     Medical: Not on file     Non-medical: Not on file   Tobacco Use   • Smoking status: Light Tobacco Smoker     Types: Cigars   • Smokeless tobacco: Never Used   Substance and Sexual Activity   • Alcohol use: No   • Drug use: Yes     Types: Inhaled     Comment: marijuana   • Sexual activity: Not on file   Lifestyle   • Physical activity:     Days per week: Not on file     Minutes per session: Not on file   • Stress: Not on file   Relationships   • Social connections:     Talks on phone: Not on file     Gets together: Not on file     Attends Bahai service: Not on file     Active member of club or organization: Not on file     Attends meetings of clubs or organizations: Not on file     Relationship status: Not on file   • Intimate partner violence:     Fear of current or ex  partner: Not on file     Emotionally abused: Not on file     Physically abused: Not on file     Forced sexual activity: Not on file   Other Topics Concern   • Not on file   Social History Narrative   • Not on file     No Known Allergies  Outpatient Encounter Medications as of 9/24/2019   Medication Sig Dispense Refill   • lisinopril (PRINIVIL) 10 MG Tab Take 1 Tab by mouth every day. 30 Tab 11   • aspirin (ASA) 81 MG Chew Tab chewable tablet Take 1 Tab by mouth every day. 100 Tab 3   • atorvastatin (LIPITOR) 80 MG tablet Take 1 Tab by mouth every evening. 90 Tab 3   • metoprolol (LOPRESSOR) 25 MG Tab Take 1 Tab by mouth 2 Times a Day. 180 Tab 3   • prasugrel (EFFIENT) 10 MG Tab Take 1 Tab by mouth every day. 90 Tab 3     No facility-administered encounter medications on file as of 9/24/2019.      Review of Systems   Constitutional: Negative for chills, fever, malaise/fatigue and weight loss.   HENT: Negative for ear discharge, ear pain, hearing loss and nosebleeds.    Eyes: Negative for blurred vision, double vision, pain and discharge.   Respiratory: Negative for cough and shortness of breath.    Cardiovascular: Negative for chest pain, palpitations, orthopnea, claudication, leg swelling and PND.   Gastrointestinal: Negative for abdominal pain, blood in stool, melena, nausea and vomiting.   Genitourinary: Negative for dysuria and hematuria.   Musculoskeletal: Negative for falls, joint pain and myalgias.   Skin: Negative for itching and rash.   Neurological: Negative for dizziness, sensory change, speech change, loss of consciousness and headaches.   Endo/Heme/Allergies: Negative for environmental allergies. Does not bruise/bleed easily.   Psychiatric/Behavioral: Negative for depression, hallucinations and suicidal ideas.        Objective:   /66 (BP Location: Left arm, Patient Position: Sitting)   Pulse 76   Ht 1.829 m (6')   Wt 103 kg (227 lb)   SpO2 94%   BMI 30.79 kg/m²     Physical Exam    Constitutional: He is oriented to person, place, and time. No distress.   HENT:   Head: Normocephalic and atraumatic.   Right Ear: External ear normal.   Left Ear: External ear normal.   Eyes: Right eye exhibits no discharge. Left eye exhibits no discharge.   Neck: No JVD present. No thyromegaly present.   Cardiovascular: Normal rate, regular rhythm, normal heart sounds and intact distal pulses. Exam reveals no gallop and no friction rub.   No murmur heard.  Pulmonary/Chest: Breath sounds normal. No respiratory distress.   Abdominal: Bowel sounds are normal. He exhibits no distension. There is no tenderness.   Musculoskeletal: He exhibits no edema or tenderness.   Neurological: He is alert and oriented to person, place, and time. No cranial nerve deficit.   Skin: Skin is warm and dry. He is not diaphoretic.   Psychiatric: He has a normal mood and affect. His behavior is normal.   Nursing note and vitals reviewed.      Assessment:     1. Coronary artery disease involving native coronary artery of native heart without angina pectoris     2. Stented coronary artery     3. HTN (hypertension), malignant     4. Mixed hyperlipidemia     5. High risk medication use         Medical Decision Making:  Today's Assessment / Status / Plan:   CAD s/p PCI and stent of LAD:  Betablocker as Metoprolol will be kept at 25 mg po 2x daily.  Dual antiplatelet therapy with ASA 81 mg po daily and Presugrel 10mg 1x daily.  Will continue Atorvastatin 80 mg po daily  Will continue Lisinopril 10 mg po daily.     Hypertension:  Optimize control with BB, ACE-I as permitted.     Hyperlipidemia:  Optimize statin as within guidelines of CAD treatment as above.     I will see patient back in clinic with lab tests and studies results in 6 months.     I thank you Dr. Liu for referring patient to our Cardiology Clinic today.

## 2020-08-17 ENCOUNTER — OFFICE VISIT (OUTPATIENT)
Dept: CARDIOLOGY | Facility: MEDICAL CENTER | Age: 65
End: 2020-08-17
Payer: MEDICARE

## 2020-08-17 VITALS
WEIGHT: 231 LBS | DIASTOLIC BLOOD PRESSURE: 77 MMHG | BODY MASS INDEX: 31.29 KG/M2 | SYSTOLIC BLOOD PRESSURE: 122 MMHG | OXYGEN SATURATION: 92 % | HEIGHT: 72 IN | HEART RATE: 80 BPM

## 2020-08-17 DIAGNOSIS — I25.10 CORONARY ARTERY DISEASE INVOLVING NATIVE CORONARY ARTERY OF NATIVE HEART WITHOUT ANGINA PECTORIS: ICD-10-CM

## 2020-08-17 DIAGNOSIS — Z79.899 HIGH RISK MEDICATION USE: ICD-10-CM

## 2020-08-17 DIAGNOSIS — I21.4 NSTEMI (NON-ST ELEVATED MYOCARDIAL INFARCTION) (HCC): ICD-10-CM

## 2020-08-17 DIAGNOSIS — E78.2 MIXED HYPERLIPIDEMIA: ICD-10-CM

## 2020-08-17 DIAGNOSIS — I10 HTN (HYPERTENSION), MALIGNANT: ICD-10-CM

## 2020-08-17 DIAGNOSIS — Z95.5 STENTED CORONARY ARTERY: ICD-10-CM

## 2020-08-17 PROCEDURE — 99214 OFFICE O/P EST MOD 30 MIN: CPT | Performed by: INTERNAL MEDICINE

## 2020-08-17 RX ORDER — LISINOPRIL 10 MG/1
10 TABLET ORAL DAILY
Qty: 90 TAB | Refills: 3 | Status: SHIPPED | OUTPATIENT
Start: 2020-08-17 | End: 2021-08-03 | Stop reason: SDUPTHER

## 2020-08-17 RX ORDER — ATORVASTATIN CALCIUM 80 MG/1
80 TABLET, FILM COATED ORAL EVERY EVENING
Qty: 90 TAB | Refills: 3 | Status: SHIPPED | OUTPATIENT
Start: 2020-08-17 | End: 2021-08-03 | Stop reason: SDUPTHER

## 2020-08-17 RX ORDER — PRASUGREL 10 MG/1
10 TABLET, FILM COATED ORAL DAILY
Qty: 90 TAB | Refills: 3 | Status: SHIPPED | OUTPATIENT
Start: 2020-08-17 | End: 2021-08-03 | Stop reason: SDUPTHER

## 2020-08-17 ASSESSMENT — ENCOUNTER SYMPTOMS
CHILLS: 0
FALLS: 0
FEVER: 0
VOMITING: 0
HEADACHES: 0
LOSS OF CONSCIOUSNESS: 0
ABDOMINAL PAIN: 0
SENSORY CHANGE: 0
EYE DISCHARGE: 0
EYE PAIN: 0
COUGH: 0
DEPRESSION: 0
SHORTNESS OF BREATH: 0
NAUSEA: 0
PND: 0
WEIGHT LOSS: 0
PALPITATIONS: 0
BRUISES/BLEEDS EASILY: 0
BLOOD IN STOOL: 0
CLAUDICATION: 0
ORTHOPNEA: 0
DIZZINESS: 0
DOUBLE VISION: 0
SPEECH CHANGE: 0
MYALGIAS: 0
BLURRED VISION: 0
HALLUCINATIONS: 0

## 2020-08-17 ASSESSMENT — FIBROSIS 4 INDEX: FIB4 SCORE: 2.21

## 2020-08-17 NOTE — PROGRESS NOTES
Chief Complaint   Patient presents with   • Coronary Artery Disease       Subjective:   Niall Lane is a 64 y.o. male who presents today for cardiac care due to CAD s/p LAD stent due to NSTEMI 03/2019, HTN, HLP.     I have independently interpreted and reviewed blood tests results with patient in clinic which shows normal LDL level 82, triglycerides, renal and liver function.    Past Medical History:   Diagnosis Date   • CAD (coronary artery disease) 03/25/2019    NSTEMI. PCI/MITUL (Synergy 4.0 x 38mm) to the mid LAD.   • Fracture    • Hyperlipidemia    • NSTEMI (non-ST elevated myocardial infarction) (HCC)      No past surgical history on file.  Family History   Problem Relation Age of Onset   • Heart Disease Mother      Social History     Socioeconomic History   • Marital status:      Spouse name: Not on file   • Number of children: Not on file   • Years of education: Not on file   • Highest education level: Not on file   Occupational History   • Not on file   Social Needs   • Financial resource strain: Not on file   • Food insecurity     Worry: Not on file     Inability: Not on file   • Transportation needs     Medical: Not on file     Non-medical: Not on file   Tobacco Use   • Smoking status: Light Tobacco Smoker     Types: Cigars   • Smokeless tobacco: Never Used   Substance and Sexual Activity   • Alcohol use: No   • Drug use: Yes     Types: Inhaled     Comment: marijuana   • Sexual activity: Not on file   Lifestyle   • Physical activity     Days per week: Not on file     Minutes per session: Not on file   • Stress: Not on file   Relationships   • Social connections     Talks on phone: Not on file     Gets together: Not on file     Attends Quaker service: Not on file     Active member of club or organization: Not on file     Attends meetings of clubs or organizations: Not on file     Relationship status: Not on file   • Intimate partner violence     Fear of current or ex partner: Not on file      Emotionally abused: Not on file     Physically abused: Not on file     Forced sexual activity: Not on file   Other Topics Concern   • Not on file   Social History Narrative   • Not on file     No Known Allergies  Outpatient Encounter Medications as of 8/17/2020   Medication Sig Dispense Refill   • lisinopril (PRINIVIL) 10 MG Tab Take 1 Tab by mouth every day. 90 Tab 3   • prasugrel (EFFIENT) 10 MG Tab Take 1 Tab by mouth every day. 90 Tab 3   • metoprolol (LOPRESSOR) 25 MG Tab Take 1 Tab by mouth 2 Times a Day. 180 Tab 3   • atorvastatin (LIPITOR) 80 MG tablet Take 1 Tab by mouth every evening. 90 Tab 3   • aspirin (ASA) 81 MG Chew Tab chewable tablet Take 1 Tab by mouth every day. 100 Tab 3     No facility-administered encounter medications on file as of 8/17/2020.      Review of Systems   Constitutional: Negative for chills, fever, malaise/fatigue and weight loss.   HENT: Negative for ear discharge, ear pain, hearing loss and nosebleeds.    Eyes: Negative for blurred vision, double vision, pain and discharge.   Respiratory: Negative for cough and shortness of breath.    Cardiovascular: Negative for chest pain, palpitations, orthopnea, claudication, leg swelling and PND.   Gastrointestinal: Negative for abdominal pain, blood in stool, melena, nausea and vomiting.   Genitourinary: Negative for dysuria and hematuria.   Musculoskeletal: Negative for falls, joint pain and myalgias.   Skin: Negative for itching and rash.   Neurological: Negative for dizziness, sensory change, speech change, loss of consciousness and headaches.   Endo/Heme/Allergies: Negative for environmental allergies. Does not bruise/bleed easily.   Psychiatric/Behavioral: Negative for depression, hallucinations and suicidal ideas.        Objective:   /77 (BP Location: Left arm, Patient Position: Sitting, BP Cuff Size: Adult)   Pulse 80   Ht 1.829 m (6')   Wt 104.8 kg (231 lb)   SpO2 92%   BMI 31.33 kg/m²     Physical Exam   Constitutional:  He is oriented to person, place, and time. No distress.   HENT:   Head: Normocephalic and atraumatic.   Right Ear: External ear normal.   Left Ear: External ear normal.   Eyes: Right eye exhibits no discharge. Left eye exhibits no discharge.   Neck: No JVD present. No thyromegaly present.   Cardiovascular: Normal rate, regular rhythm, normal heart sounds and intact distal pulses. Exam reveals no gallop and no friction rub.   No murmur heard.  Pulmonary/Chest: Breath sounds normal. No respiratory distress.   Abdominal: Bowel sounds are normal. He exhibits no distension. There is no abdominal tenderness.   Musculoskeletal:         General: No tenderness or edema.   Neurological: He is alert and oriented to person, place, and time. No cranial nerve deficit.   Skin: Skin is warm and dry. He is not diaphoretic.   Psychiatric: He has a normal mood and affect. His behavior is normal.   Nursing note and vitals reviewed.      Assessment:     1. Coronary artery disease involving native coronary artery of native heart without angina pectoris     2. Stented coronary artery     3. HTN (hypertension), malignant     4. Mixed hyperlipidemia     5. High risk medication use         Medical Decision Making:  Today's Assessment / Status / Plan:   CAD s/p PCI and stent of LAD:  Betablocker as Metoprolol will be kept at 25 mg po 2x daily.  Dual antiplatelet therapy with ASA 81 mg po daily and Presugrel 10mg 1x daily.  Will continue Atorvastatin 80 mg po daily  Will continue Lisinopril 10 mg po daily.     Hypertension:  Optimize control with BB, ACE-I as permitted.     Hyperlipidemia:  Optimize statin as within guidelines of CAD treatment as above.     I will see patient back in clinic with lab tests and studies results in 6 months.     I thank you Dr. Liu for referring patient to our Cardiology Clinic today.

## 2020-12-14 ENCOUNTER — PATIENT OUTREACH (OUTPATIENT)
Dept: HEALTH INFORMATION MANAGEMENT | Facility: OTHER | Age: 65
End: 2020-12-14

## 2020-12-16 NOTE — PROGRESS NOTES
Called member to confirm [facility] PCP for 2021 SCP Plan. Verified [employer]. I was able to schedule the member for a new patient appointment with Daron Quezada. Member had no questions or concerns at this time. Used [employer] and [employer].

## 2021-01-06 ENCOUNTER — OFFICE VISIT (OUTPATIENT)
Dept: MEDICAL GROUP | Facility: PHYSICIAN GROUP | Age: 66
End: 2021-01-06
Payer: MEDICARE

## 2021-01-06 VITALS
SYSTOLIC BLOOD PRESSURE: 120 MMHG | HEART RATE: 82 BPM | HEIGHT: 64 IN | DIASTOLIC BLOOD PRESSURE: 82 MMHG | RESPIRATION RATE: 14 BRPM | TEMPERATURE: 98.4 F | WEIGHT: 237 LBS | OXYGEN SATURATION: 93 % | BODY MASS INDEX: 40.46 KG/M2

## 2021-01-06 DIAGNOSIS — Z23 NEED FOR VACCINATION: ICD-10-CM

## 2021-01-06 DIAGNOSIS — I25.10 CORONARY ARTERY DISEASE INVOLVING NATIVE CORONARY ARTERY OF NATIVE HEART WITHOUT ANGINA PECTORIS: ICD-10-CM

## 2021-01-06 DIAGNOSIS — E78.2 MIXED HYPERLIPIDEMIA: ICD-10-CM

## 2021-01-06 DIAGNOSIS — R73.03 BORDERLINE DIABETES MELLITUS: ICD-10-CM

## 2021-01-06 DIAGNOSIS — E66.01 MORBID OBESITY WITH BMI OF 40.0-44.9, ADULT (HCC): ICD-10-CM

## 2021-01-06 PROCEDURE — G0009 ADMIN PNEUMOCOCCAL VACCINE: HCPCS | Performed by: FAMILY MEDICINE

## 2021-01-06 PROCEDURE — 99204 OFFICE O/P NEW MOD 45 MIN: CPT | Mod: 25 | Performed by: FAMILY MEDICINE

## 2021-01-06 PROCEDURE — 90732 PPSV23 VACC 2 YRS+ SUBQ/IM: CPT | Performed by: FAMILY MEDICINE

## 2021-01-06 ASSESSMENT — ENCOUNTER SYMPTOMS
CONSTITUTIONAL NEGATIVE: 1
GASTROINTESTINAL NEGATIVE: 1
CHILLS: 0
RESPIRATORY NEGATIVE: 1
MUSCULOSKELETAL NEGATIVE: 1
DOUBLE VISION: 0
EYES NEGATIVE: 1
PSYCHIATRIC NEGATIVE: 1
HEMOPTYSIS: 0
HEADACHES: 0
TINGLING: 0
BLURRED VISION: 0
HEARTBURN: 0
NAUSEA: 0
BRUISES/BLEEDS EASILY: 0
DIZZINESS: 0
CARDIOVASCULAR NEGATIVE: 1
FEVER: 0
DEPRESSION: 0
MYALGIAS: 0
NEUROLOGICAL NEGATIVE: 1
COUGH: 0
PALPITATIONS: 0

## 2021-01-06 ASSESSMENT — FIBROSIS 4 INDEX: FIB4 SCORE: 2.21

## 2021-01-06 ASSESSMENT — PATIENT HEALTH QUESTIONNAIRE - PHQ9: CLINICAL INTERPRETATION OF PHQ2 SCORE: 0

## 2021-01-06 NOTE — PROGRESS NOTES
Subjective:      Niall Lane is a 65 y.o. male who presents with Establish Care            1. Need for vaccination    - PneumoVax PPV23 =>1yo  - Lipid Profile; Future  - HEMOGLOBIN A1C; Future  - Comp Metabolic Panel; Future  - CBC WITHOUT DIFFERENTIAL; Future    2. Coronary artery disease involving native coronary artery of native heart without angina pectoris  On statin and thinner  No new sx  The patient's current medical issue is well controlled on the current therapy with no new symptoms or worsening     - Lipid Profile; Future  - HEMOGLOBIN A1C; Future  - Comp Metabolic Panel; Future  - CBC WITHOUT DIFFERENTIAL; Future    3. Mixed hyperlipidemia  Currently treated for HLD, taking meds with no new myalgias or joint pain, watching fats in diet  controlled    - Lipid Profile; Future  - HEMOGLOBIN A1C; Future  - Comp Metabolic Panel; Future  - CBC WITHOUT DIFFERENTIAL; Future    4. Borderline diabetes mellitus  Needs new labs, trying to follow diet and exercise  - Lipid Profile; Future  - HEMOGLOBIN A1C; Future  - Comp Metabolic Panel; Future  - CBC WITHOUT DIFFERENTIAL; Future    5. Morbid obesity with BMI of 40.0-44.9, adult ([facility])    - Patient identified as having weight management issue.  Appropriate orders and counseling given.    Past Medical History:  03/25/2019: CAD (coronary artery disease)      Comment:  NSTEMI. PCI/MITUL (Synergy 4.0 x 38mm) to the mid LAD.  No date: Fracture  No date: Hyperlipidemia  No date: NSTEMI (non-ST elevated myocardial infarction) ([facility])  History reviewed. No pertinent surgical history.  Social History    Tobacco Use      Smoking status: Former Smoker        Types: Cigars      Smokeless tobacco: Never Used    Alcohol use: Yes    Drug use: Yes      Types: Inhaled, Marijuana      Comment: marijuana    Review of patient's family history indicates:  Problem: Heart Disease      Relation: Mother          Age of Onset: (Not Specified)      Current Outpatient Medications: •   "prasugrel (EFFIENT) 10 MG Tab, Take 1 Tab by mouth every day., Disp: 90 Tab, Rfl: 3•  lisinopril (PRINIVIL) 10 MG Tab, Take 1 Tab by mouth every day., Disp: 90 Tab, Rfl: 3•  atorvastatin (LIPITOR) 80 MG tablet, Take 1 Tab by mouth every evening., Disp: 90 Tab, Rfl: 3•  metoprolol (LOPRESSOR) 25 MG Tab, Take 1 Tab by mouth 2 Times a Day., Disp: 180 Tab, Rfl: 3•  aspirin (ASA) 81 MG Chew Tab chewable tablet, Take 1 Tab by mouth every day., Disp: 100 Tab, Rfl: 3    Patient was instructed on the use of medications, either prescriptions or OTC and informed on when the appropriate follow up time period should be. In addition, patient was also instructed that should any acute worsening occur that they should notify this clinic asap or call 911.          Review of Systems   Constitutional: Negative.  Negative for chills and fever.   HENT: Negative.  Negative for hearing loss.    Eyes: Negative.  Negative for blurred vision and double vision.   Respiratory: Negative.  Negative for cough and hemoptysis.    Cardiovascular: Negative.  Negative for chest pain and palpitations.   Gastrointestinal: Negative.  Negative for heartburn and nausea.   Genitourinary: Negative.  Negative for dysuria.   Musculoskeletal: Negative.  Negative for myalgias.   Skin: Negative.  Negative for rash.   Neurological: Negative.  Negative for dizziness, tingling and headaches.   Endo/Heme/Allergies: Negative.  Does not bruise/bleed easily.   Psychiatric/Behavioral: Negative.  Negative for depression and suicidal ideas.   All other systems reviewed and are negative.         Objective:     /82 (BP Location: Left arm, Patient Position: Sitting, BP Cuff Size: Adult long)   Pulse 82   Temp 36.9 °C (98.4 °F) (Temporal)   Resp 14   Ht 1.626 m (5' 4\")   Wt 107.5 kg (237 lb)   SpO2 93%   BMI 40.68 kg/m²      Physical Exam  Vitals signs and nursing note reviewed.   Constitutional:       General: He is not in acute distress.     Appearance: He is " well-developed. He is not diaphoretic.   HENT:      Head: Normocephalic and atraumatic.      Mouth/Throat:      Pharynx: No oropharyngeal exudate.   Eyes:      Pupils: Pupils are equal, round, and reactive to light.   Cardiovascular:      Rate and Rhythm: Normal rate and regular rhythm.      Heart sounds: Normal heart sounds. No murmur. No friction rub. No gallop.    Pulmonary:      Effort: Pulmonary effort is normal. No respiratory distress.      Breath sounds: Normal breath sounds. No wheezing or rales.   Chest:      Chest wall: No tenderness.   Neurological:      Mental Status: He is alert and oriented to person, place, and time.   Psychiatric:         Behavior: Behavior normal.         Thought Content: Thought content normal.         Judgment: Judgment normal.                 Assessment/Plan:        1. Need for vaccination    - PneumoVax PPV23 =>1yo  - Lipid Profile; Future  - HEMOGLOBIN A1C; Future  - Comp Metabolic Panel; Future  - CBC WITHOUT DIFFERENTIAL; Future    2. Coronary artery disease involving native coronary artery of native heart without angina pectoris    - Lipid Profile; Future  - HEMOGLOBIN A1C; Future  - Comp Metabolic Panel; Future  - CBC WITHOUT DIFFERENTIAL; Future    3. Mixed hyperlipidemia    - Lipid Profile; Future  - HEMOGLOBIN A1C; Future  - Comp Metabolic Panel; Future  - CBC WITHOUT DIFFERENTIAL; Future    4. Borderline diabetes mellitus    - Lipid Profile; Future  - HEMOGLOBIN A1C; Future  - Comp Metabolic Panel; Future  - CBC WITHOUT DIFFERENTIAL; Future    5. Morbid obesity with BMI of 40.0-44.9, adult (HCC)    - Patient identified as having weight management issue.  Appropriate orders and counseling given.

## 2021-03-10 ENCOUNTER — NURSE TRIAGE (OUTPATIENT)
Dept: HEALTH INFORMATION MANAGEMENT | Facility: OTHER | Age: 66
End: 2021-03-10

## 2021-03-10 DIAGNOSIS — Z23 NEED FOR VACCINATION: ICD-10-CM

## 2021-03-12 ENCOUNTER — IMMUNIZATION (OUTPATIENT)
Dept: FAMILY PLANNING/WOMEN'S HEALTH CLINIC | Facility: IMMUNIZATION CENTER | Age: 66
End: 2021-03-12
Attending: INTERNAL MEDICINE
Payer: MEDICARE

## 2021-03-12 DIAGNOSIS — Z23 ENCOUNTER FOR VACCINATION: Primary | ICD-10-CM

## 2021-03-12 DIAGNOSIS — Z23 NEED FOR VACCINATION: ICD-10-CM

## 2021-03-12 PROCEDURE — 0011A MODERNA SARS-COV-2 VACCINE: CPT

## 2021-03-12 PROCEDURE — 91301 MODERNA SARS-COV-2 VACCINE: CPT

## 2021-04-09 ENCOUNTER — PATIENT MESSAGE (OUTPATIENT)
Dept: HEALTH INFORMATION MANAGEMENT | Facility: OTHER | Age: 66
End: 2021-04-09

## 2021-04-17 ENCOUNTER — IMMUNIZATION (OUTPATIENT)
Dept: FAMILY PLANNING/WOMEN'S HEALTH CLINIC | Facility: IMMUNIZATION CENTER | Age: 66
End: 2021-04-17
Attending: INTERNAL MEDICINE
Payer: MEDICARE

## 2021-04-17 DIAGNOSIS — Z23 ENCOUNTER FOR VACCINATION: Primary | ICD-10-CM

## 2021-04-17 PROCEDURE — 91301 MODERNA SARS-COV-2 VACCINE: CPT | Performed by: INTERNAL MEDICINE

## 2021-04-17 PROCEDURE — 0012A MODERNA SARS-COV-2 VACCINE: CPT | Performed by: INTERNAL MEDICINE

## 2021-07-23 ENCOUNTER — HOSPITAL ENCOUNTER (OUTPATIENT)
Dept: LAB | Facility: MEDICAL CENTER | Age: 66
End: 2021-07-23
Attending: FAMILY MEDICINE
Payer: MEDICARE

## 2021-07-23 DIAGNOSIS — I25.10 CORONARY ARTERY DISEASE INVOLVING NATIVE CORONARY ARTERY OF NATIVE HEART WITHOUT ANGINA PECTORIS: ICD-10-CM

## 2021-07-23 DIAGNOSIS — Z23 NEED FOR VACCINATION: ICD-10-CM

## 2021-07-23 DIAGNOSIS — E78.2 MIXED HYPERLIPIDEMIA: ICD-10-CM

## 2021-07-23 DIAGNOSIS — R73.03 BORDERLINE DIABETES MELLITUS: ICD-10-CM

## 2021-07-23 LAB
ALBUMIN SERPL BCP-MCNC: 4.3 G/DL (ref 3.2–4.9)
ALBUMIN/GLOB SERPL: 1.3 G/DL
ALP SERPL-CCNC: 65 U/L (ref 30–99)
ALT SERPL-CCNC: 36 U/L (ref 2–50)
ANION GAP SERPL CALC-SCNC: 13 MMOL/L (ref 7–16)
AST SERPL-CCNC: 31 U/L (ref 12–45)
BILIRUB SERPL-MCNC: 1 MG/DL (ref 0.1–1.5)
BUN SERPL-MCNC: 11 MG/DL (ref 8–22)
CALCIUM SERPL-MCNC: 9.2 MG/DL (ref 8.5–10.5)
CHLORIDE SERPL-SCNC: 105 MMOL/L (ref 96–112)
CHOLEST SERPL-MCNC: 91 MG/DL (ref 100–199)
CO2 SERPL-SCNC: 20 MMOL/L (ref 20–33)
CREAT SERPL-MCNC: 0.91 MG/DL (ref 0.5–1.4)
ERYTHROCYTE [DISTWIDTH] IN BLOOD BY AUTOMATED COUNT: 46.3 FL (ref 35.9–50)
EST. AVERAGE GLUCOSE BLD GHB EST-MCNC: 126 MG/DL
FASTING STATUS PATIENT QL REPORTED: NORMAL
GLOBULIN SER CALC-MCNC: 3.2 G/DL (ref 1.9–3.5)
GLUCOSE SERPL-MCNC: 117 MG/DL (ref 65–99)
HBA1C MFR BLD: 6 % (ref 4–5.6)
HCT VFR BLD AUTO: 50.5 % (ref 42–52)
HDLC SERPL-MCNC: 36 MG/DL
HGB BLD-MCNC: 16.7 G/DL (ref 14–18)
LDLC SERPL CALC-MCNC: 38 MG/DL
MCH RBC QN AUTO: 30 PG (ref 27–33)
MCHC RBC AUTO-ENTMCNC: 33.1 G/DL (ref 33.7–35.3)
MCV RBC AUTO: 90.7 FL (ref 81.4–97.8)
PLATELET # BLD AUTO: 227 K/UL (ref 164–446)
PMV BLD AUTO: 12.5 FL (ref 9–12.9)
POTASSIUM SERPL-SCNC: 4.4 MMOL/L (ref 3.6–5.5)
PROT SERPL-MCNC: 7.5 G/DL (ref 6–8.2)
RBC # BLD AUTO: 5.57 M/UL (ref 4.7–6.1)
SODIUM SERPL-SCNC: 138 MMOL/L (ref 135–145)
TRIGL SERPL-MCNC: 85 MG/DL (ref 0–149)
WBC # BLD AUTO: 9.6 K/UL (ref 4.8–10.8)

## 2021-07-23 PROCEDURE — 36415 COLL VENOUS BLD VENIPUNCTURE: CPT

## 2021-07-23 PROCEDURE — 85027 COMPLETE CBC AUTOMATED: CPT

## 2021-07-23 PROCEDURE — 83036 HEMOGLOBIN GLYCOSYLATED A1C: CPT

## 2021-07-23 PROCEDURE — 80053 COMPREHEN METABOLIC PANEL: CPT

## 2021-07-23 PROCEDURE — 80061 LIPID PANEL: CPT

## 2021-07-27 ENCOUNTER — OFFICE VISIT (OUTPATIENT)
Dept: MEDICAL GROUP | Facility: PHYSICIAN GROUP | Age: 66
End: 2021-07-27
Payer: MEDICARE

## 2021-07-27 VITALS
DIASTOLIC BLOOD PRESSURE: 70 MMHG | BODY MASS INDEX: 33.29 KG/M2 | TEMPERATURE: 97.4 F | SYSTOLIC BLOOD PRESSURE: 112 MMHG | RESPIRATION RATE: 16 BRPM | HEIGHT: 71 IN | OXYGEN SATURATION: 92 % | WEIGHT: 237.8 LBS | HEART RATE: 79 BPM

## 2021-07-27 DIAGNOSIS — Z00.00 MEDICARE ANNUAL WELLNESS VISIT, SUBSEQUENT: ICD-10-CM

## 2021-07-27 DIAGNOSIS — Z12.11 COLON CANCER SCREENING: ICD-10-CM

## 2021-07-27 DIAGNOSIS — I25.10 CORONARY ARTERY DISEASE INVOLVING NATIVE CORONARY ARTERY OF NATIVE HEART WITHOUT ANGINA PECTORIS: ICD-10-CM

## 2021-07-27 DIAGNOSIS — E74.39 GLUCOSE INTOLERANCE: ICD-10-CM

## 2021-07-27 DIAGNOSIS — E78.2 MIXED HYPERLIPIDEMIA: ICD-10-CM

## 2021-07-27 PROBLEM — I21.4 NSTEMI (NON-ST ELEVATED MYOCARDIAL INFARCTION) (HCC): Status: RESOLVED | Noted: 2019-03-25 | Resolved: 2021-07-27

## 2021-07-27 PROBLEM — E66.01 MORBID OBESITY WITH BMI OF 40.0-44.9, ADULT (HCC): Status: RESOLVED | Noted: 2021-01-06 | Resolved: 2021-07-27

## 2021-07-27 PROBLEM — R73.03 BORDERLINE DIABETES MELLITUS: Status: RESOLVED | Noted: 2019-03-25 | Resolved: 2021-07-27

## 2021-07-27 PROCEDURE — G0439 PPPS, SUBSEQ VISIT: HCPCS | Performed by: FAMILY MEDICINE

## 2021-07-27 ASSESSMENT — FIBROSIS 4 INDEX: FIB4 SCORE: 1.52

## 2021-07-27 ASSESSMENT — ACTIVITIES OF DAILY LIVING (ADL): BATHING_REQUIRES_ASSISTANCE: 0

## 2021-07-27 ASSESSMENT — ENCOUNTER SYMPTOMS: GENERAL WELL-BEING: GOOD

## 2021-07-27 ASSESSMENT — PATIENT HEALTH QUESTIONNAIRE - PHQ9: CLINICAL INTERPRETATION OF PHQ2 SCORE: 0

## 2021-07-27 NOTE — PROGRESS NOTES
Chief Complaint   Patient presents with   • Annual Exam         HPI:  Niall is a 66 y.o. here for Medicare Annual Wellness Visit        Patient Active Problem List    Diagnosis Date Noted   • Morbid obesity with BMI of 40.0-44.9, adult ([facility]) 01/06/2021   • CAD (coronary artery disease) 04/22/2019   • Hyperlipidemia 04/22/2019   • NSTEMI (non-ST elevated myocardial infarction) ([facility]) 03/25/2019   • Borderline diabetes mellitus 03/25/2019       Current Outpatient Medications   Medication Sig Dispense Refill   • prasugrel (EFFIENT) 10 MG Tab Take 1 Tab by mouth every day. 90 Tab 3   • lisinopril (PRINIVIL) 10 MG Tab Take 1 Tab by mouth every day. 90 Tab 3   • atorvastatin (LIPITOR) 80 MG tablet Take 1 Tab by mouth every evening. 90 Tab 3   • metoprolol (LOPRESSOR) 25 MG Tab Take 1 Tab by mouth 2 Times a Day. 180 Tab 3   • aspirin (ASA) 81 MG Chew Tab chewable tablet Take 1 Tab by mouth every day. 100 Tab 3     No current facility-administered medications for this visit.        Patient is taking medications as noted in medication list.  Current supplements as per medication list.     Allergies: Patient has no known allergies.    Current social contact/activities: 2020     Is patient current with immunizations? Yes.    He  reports that he has quit smoking. His smoking use included cigars. He has never used smokeless tobacco. He reports current alcohol use. He reports current drug use. Drugs: Inhaled and Marijuana.  Counseling given: Not Answered        DPA/Advanced directive: Patient does not have an Advanced Directive.  A packet and workshop information was given on Advanced Directives.    ROS:    Gait: Uses no assistive device  Ostomy: No   Other tubes: No   Amputations: No   Chronic oxygen use No   Last eye exam 2020   Wears hearing aids: No   : Denies any urinary leakage during the last 6 months      Screening:        Depression Screening    Little interest or pleasure in doing things?  0 - not at all  Feeling down,  depressed, or hopeless? 0 - not at all  Patient Health Questionnaire Score: 0    If depressive symptoms identified deferred to follow up visit unless specifically addressed in assessment and plan.    Interpretation of PHQ-9 Total Score   Score Severity   1-4 No Depression   5-9 Mild Depression   10-14 Moderate Depression   15-19 Moderately Severe Depression   20-27 Severe Depression    Screening for Cognitive Impairment    Three Minute Recall (captain, garden, picture)  2/3    Oscar clock face with all 12 numbers and set the hands to show 5 past 8.  Yes    If cognitive concerns identified, deferred for follow up unless specifically addressed in assessment and plan.    Fall Risk Assessment    Has the patient had two or more falls in the last year or any fall with injury in the last year?  No  If fall risk identified, deferred for follow up unless specifically addressed in assessment and plan.    Safety Assessment    Throw rugs on floor.  Yes  Handrails on all stairs.  Yes  Good lighting in all hallways.  Yes  Difficulty hearing.  No  Patient counseled about all safety risks that were identified.    Functional Assessment ADLs    Are there any barriers preventing you from cooking for yourself or meeting nutritional needs?  No.    Are there any barriers preventing you from driving safely or obtaining transportation?  No.    Are there any barriers preventing you from using a telephone or calling for help?  No.    Are there any barriers preventing you from shopping?  No.    Are there any barriers preventing you from taking care of your own finances?  No.    Are there any barriers preventing you from managing your medications?  No.    Are there any barriers preventing you from showering, bathing or dressing yourself?  No.    Are you currently engaging in any exercise or physical activity?  Yes.     What is your perception of your health?  Good.    Health Maintenance Summary                Annual Wellness Visit Overdue  "1955     IMM ZOSTER VACCINES Overdue 6/6/2004     IMM DTaP/Tdap/Td Vaccine Postponed 1/6/2022 Originally 6/6/1973. Patient Refused    IMM INFLUENZA Next Due 9/1/2021     COLONOSCOPY Next Due 1/6/2031      Done 1/6/2021           Patient Care Team:  Daron Quezada M.D. as PCP - General (Family Medicine)  Ivy Garrett, Med Ass't as [facility]     Social History     Tobacco Use   • Smoking status: Former Smoker     Types: Cigars   • Smokeless tobacco: Never Used   Vaping Use   • Vaping Use: Never used   Substance Use Topics   • Alcohol use: Yes   • Drug use: Yes     Types: Inhaled, Marijuana     Comment: marijuana     Family History   Problem Relation Age of Onset   • Heart Disease Mother      He  has a past medical history of CAD (coronary artery disease) (03/25/2019), Fracture, Hyperlipidemia, and NSTEMI (non-ST elevated myocardial infarction) ([facility]).   No past surgical history on file.        Exam:     /70 (BP Location: Left arm, Patient Position: Sitting, BP Cuff Size: Adult)   Pulse 79   Temp 36.3 °C (97.4 °F) (Temporal)   Resp 16   Ht 1.803 m (5' 11\")   Wt 108 kg (237 lb 12.8 oz)   SpO2 92%  Body mass index is 33.17 kg/m².    Hearing good.    Dentition good  Alert, oriented in no acute distress.  Eye contact is good, speech goal directed, affect calm      Assessment and Plan. The following treatment and monitoring plan is recommended:    1. Medicare annual wellness visit, subsequent  Patient was advised that one or more vaccines are recommended today as according to their HM. They currently decline all vaccines advised today despite our encouragement      2. Glucose intolerance  Due to the patient's issues with glucose management, today they were extensively counseled on a low carb diet and exercise and losing weight      3. Coronary artery disease involving native coronary artery of native heart without angina pectoris  On statin and thinner    4. Mixed " hyperlipidemia  Currently treated for HLD, taking meds with no new myalgias or joint pain, watching fats in diet  controlled        5. Colon cancer screening    - REFERRAL TO GASTROENTEROLOGY    Past Medical History:   Diagnosis Date   • CAD (coronary artery disease) 03/25/2019    NSTEMI. PCI/MITUL (Synergy 4.0 x 38mm) to the mid LAD.   • Fracture    • Hyperlipidemia    • NSTEMI (non-ST elevated myocardial infarction) ([facility])      No past surgical history on file.  Social History     Tobacco Use   • Smoking status: Former Smoker     Types: Cigars   • Smokeless tobacco: Never Used   Vaping Use   • Vaping Use: Never used   Substance Use Topics   • Alcohol use: Yes   • Drug use: Yes     Types: Inhaled, Marijuana     Comment: marijuana     Family History   Problem Relation Age of Onset   • Heart Disease Mother          Current Outpatient Medications:   •  prasugrel (EFFIENT) 10 MG Tab, Take 1 Tab by mouth every day., Disp: 90 Tab, Rfl: 3  •  lisinopril (PRINIVIL) 10 MG Tab, Take 1 Tab by mouth every day., Disp: 90 Tab, Rfl: 3  •  atorvastatin (LIPITOR) 80 MG tablet, Take 1 Tab by mouth every evening., Disp: 90 Tab, Rfl: 3  •  metoprolol (LOPRESSOR) 25 MG Tab, Take 1 Tab by mouth 2 Times a Day., Disp: 180 Tab, Rfl: 3  •  aspirin (ASA) 81 MG Chew Tab chewable tablet, Take 1 Tab by mouth every day., Disp: 100 Tab, Rfl: 3    Patient was instructed on the use of medications, either prescriptions or OTC and informed on when the appropriate follow up time period should be. In addition, patient was also instructed that should any acute worsening occur that they should notify this clinic asap or call 911.        Services suggested: No services needed at this time  Health Care Screening recommendations as per orders if indicated.  Referrals offered: PT/OT/Nutrition counseling/Behavioral Health/Smoking cessation as per orders if indicated.    Discussion today about general wellness and lifestyle habits:    · Prevent falls and reduce  trip hazards; Cautioned about securing or removing rugs.  · Have a working fire alarm and carbon monoxide detector;   · Engage in regular physical activity and social activities       Follow-up: No follow-ups on file.

## 2021-08-03 ENCOUNTER — OFFICE VISIT (OUTPATIENT)
Dept: CARDIOLOGY | Facility: MEDICAL CENTER | Age: 66
End: 2021-08-03
Payer: MEDICARE

## 2021-08-03 VITALS
RESPIRATION RATE: 16 BRPM | BODY MASS INDEX: 32.62 KG/M2 | DIASTOLIC BLOOD PRESSURE: 72 MMHG | OXYGEN SATURATION: 94 % | WEIGHT: 233 LBS | HEIGHT: 71 IN | SYSTOLIC BLOOD PRESSURE: 112 MMHG | HEART RATE: 77 BPM

## 2021-08-03 DIAGNOSIS — Z95.5 STENTED CORONARY ARTERY: ICD-10-CM

## 2021-08-03 DIAGNOSIS — E78.2 MIXED HYPERLIPIDEMIA: ICD-10-CM

## 2021-08-03 DIAGNOSIS — Z79.899 HIGH RISK MEDICATION USE: ICD-10-CM

## 2021-08-03 DIAGNOSIS — I21.4 NSTEMI (NON-ST ELEVATED MYOCARDIAL INFARCTION) (HCC): ICD-10-CM

## 2021-08-03 DIAGNOSIS — I10 HTN (HYPERTENSION), MALIGNANT: ICD-10-CM

## 2021-08-03 DIAGNOSIS — I25.10 CORONARY ARTERY DISEASE INVOLVING NATIVE CORONARY ARTERY OF NATIVE HEART WITHOUT ANGINA PECTORIS: ICD-10-CM

## 2021-08-03 PROCEDURE — 99214 OFFICE O/P EST MOD 30 MIN: CPT | Performed by: INTERNAL MEDICINE

## 2021-08-03 RX ORDER — PRASUGREL 10 MG/1
10 TABLET, FILM COATED ORAL DAILY
Qty: 90 TABLET | Refills: 3 | Status: SHIPPED | OUTPATIENT
Start: 2021-08-03

## 2021-08-03 RX ORDER — ATORVASTATIN CALCIUM 80 MG/1
80 TABLET, FILM COATED ORAL EVERY EVENING
Qty: 90 TABLET | Refills: 3 | Status: SHIPPED | OUTPATIENT
Start: 2021-08-03

## 2021-08-03 RX ORDER — LISINOPRIL 10 MG/1
10 TABLET ORAL DAILY
Qty: 90 TABLET | Refills: 3 | Status: SHIPPED | OUTPATIENT
Start: 2021-08-03

## 2021-08-03 ASSESSMENT — ENCOUNTER SYMPTOMS
FEVER: 0
EYE PAIN: 0
WEIGHT LOSS: 0
MYALGIAS: 0
BLOOD IN STOOL: 0
CLAUDICATION: 0
LOSS OF CONSCIOUSNESS: 0
ABDOMINAL PAIN: 0
DEPRESSION: 0
BLURRED VISION: 0
NAUSEA: 0
ORTHOPNEA: 0
EYE DISCHARGE: 0
HEADACHES: 0
FALLS: 0
DIZZINESS: 0
PND: 0
SENSORY CHANGE: 0
PALPITATIONS: 0
CHILLS: 0
COUGH: 0
VOMITING: 0
DOUBLE VISION: 0
HALLUCINATIONS: 0
SHORTNESS OF BREATH: 0
SPEECH CHANGE: 0
BRUISES/BLEEDS EASILY: 0

## 2021-08-03 ASSESSMENT — FIBROSIS 4 INDEX: FIB4 SCORE: 1.52

## 2021-08-03 NOTE — PROGRESS NOTES
Chief Complaint   Patient presents with   • Coronary Artery Disease     F/V Dx: Coronary artery disease involving native coronary artery of native heart without angina pectoris   • Hyperlipidemia       Subjective:   Niall Lane is a 64 y.o. male who presents today for cardiac care due to CAD s/p LAD stent due to NSTEMI 03/2019, HTN, HLP.     I have independently interpreted and reviewed blood tests results with patient in clinic which shows normal LDL level 38, triglycerides 85, renal and liver function.    Past Medical History:   Diagnosis Date   • CAD (coronary artery disease) 03/25/2019    NSTEMI. PCI/MITUL (Synergy 4.0 x 38mm) to the mid LAD.   • Fracture    • Hyperlipidemia    • NSTEMI (non-ST elevated myocardial infarction) (HCC)      History reviewed. No pertinent surgical history.  Family History   Problem Relation Age of Onset   • Heart Disease Mother      Social History     Socioeconomic History   • Marital status:      Spouse name: Not on file   • Number of children: Not on file   • Years of education: Not on file   • Highest education level: Not on file   Occupational History   • Not on file   Tobacco Use   • Smoking status: Former Smoker     Types: Cigars   • Smokeless tobacco: Never Used   Vaping Use   • Vaping Use: Never used   Substance and Sexual Activity   • Alcohol use: Yes     Alcohol/week: 1.8 oz     Types: 3 Standard drinks or equivalent per week   • Drug use: Yes     Frequency: 7.0 times per week     Types: Inhaled, Marijuana     Comment: marijuana   • Sexual activity: Yes   Other Topics Concern   • Not on file   Social History Narrative   • Not on file     Social Determinants of Health     Financial Resource Strain:    • Difficulty of Paying Living Expenses:    Food Insecurity:    • Worried About Running Out of Food in the Last Year:    • Ran Out of Food in the Last Year:    Transportation Needs:    • Lack of Transportation (Medical):    • Lack of Transportation (Non-Medical):     Physical Activity:    • Days of Exercise per Week:    • Minutes of Exercise per Session:    Stress:    • Feeling of Stress :    Social Connections:    • Frequency of Communication with Friends and Family:    • Frequency of Social Gatherings with Friends and Family:    • Attends [employer] Services:    • Active Member of Clubs or Organizations:    • Attends Club or Organization Meetings:    • Marital Status:    Intimate Partner Violence:    • Fear of Current or Ex-Partner:    • Emotionally Abused:    • Physically Abused:    • Sexually Abused:      No Known Allergies  Outpatient Encounter Medications as of 8/3/2021   Medication Sig Dispense Refill   • prasugrel (EFFIENT) 10 MG Tab Take 1 tablet by mouth every day. 90 tablet 3   • lisinopril (PRINIVIL) 10 MG Tab Take 1 tablet by mouth every day. 90 tablet 3   • atorvastatin (LIPITOR) 80 MG tablet Take 1 tablet by mouth every evening. 90 tablet 3   • metoprolol tartrate (LOPRESSOR) 25 MG Tab Take 1 tablet by mouth 2 times a day. 180 tablet 3   • aspirin (ASA) 81 MG Chew Tab chewable tablet Take 1 Tab by mouth every day. 100 Tab 3   • [DISCONTINUED] prasugrel (EFFIENT) 10 MG Tab Take 1 Tab by mouth every day. 90 Tab 3   • [DISCONTINUED] lisinopril (PRINIVIL) 10 MG Tab Take 1 Tab by mouth every day. 90 Tab 3   • [DISCONTINUED] atorvastatin (LIPITOR) 80 MG tablet Take 1 Tab by mouth every evening. 90 Tab 3   • [DISCONTINUED] metoprolol (LOPRESSOR) 25 MG Tab Take 1 Tab by mouth 2 Times a Day. 180 Tab 3     No facility-administered encounter medications on file as of 8/3/2021.     Review of Systems   Constitutional: Negative for chills, fever, malaise/fatigue and weight loss.   HENT: Negative for ear discharge, ear pain, hearing loss and nosebleeds.    Eyes: Negative for blurred vision, double vision, pain and discharge.   Respiratory: Negative for cough and shortness of breath.    Cardiovascular: Negative for chest pain, palpitations, orthopnea, claudication, leg swelling  "and PND.   Gastrointestinal: Negative for abdominal pain, blood in stool, melena, nausea and vomiting.   Genitourinary: Negative for dysuria and hematuria.   Musculoskeletal: Negative for falls, joint pain and myalgias.   Skin: Negative for itching and rash.   Neurological: Negative for dizziness, sensory change, speech change, loss of consciousness and headaches.   Endo/Heme/Allergies: Negative for environmental allergies. Does not bruise/bleed easily.   Psychiatric/Behavioral: Negative for depression, hallucinations and suicidal ideas.        Objective:   /72 (BP Location: Left arm, Patient Position: Sitting, BP Cuff Size: Adult)   Pulse 77   Resp 16   Ht 1.803 m (5' 11\")   Wt 106 kg (233 lb)   SpO2 94%   BMI 32.50 kg/m²     Physical Exam   Constitutional: He is oriented to person, place, and time. No distress.   HENT:   Head: Normocephalic and atraumatic.   Right Ear: External ear normal.   Left Ear: External ear normal.   Eyes: Right eye exhibits no discharge. Left eye exhibits no discharge.   Neck: No JVD present. No thyromegaly present.   Cardiovascular: Normal rate, regular rhythm and normal heart sounds. Exam reveals no gallop and no friction rub.   No murmur heard.  Pulmonary/Chest: Breath sounds normal. No respiratory distress.   Abdominal: Bowel sounds are normal. He exhibits no distension. There is no abdominal tenderness.   Musculoskeletal:         General: No tenderness.   Neurological: He is alert and oriented to person, place, and time. No cranial nerve deficit.   Skin: Skin is warm and dry. He is not diaphoretic.   Psychiatric: His behavior is normal.   Nursing note and vitals reviewed.      Assessment:     1. Coronary artery disease involving native coronary artery of native heart without angina pectoris  prasugrel (EFFIENT) 10 MG Tab    lisinopril (PRINIVIL) 10 MG Tab   2. Stented coronary artery  lisinopril (PRINIVIL) 10 MG Tab    LIPID PANEL    Basic Metabolic Panel   3. HTN " (hypertension), malignant  lisinopril (PRINIVIL) 10 MG Tab   4. Mixed hyperlipidemia  lisinopril (PRINIVIL) 10 MG Tab    atorvastatin (LIPITOR) 80 MG tablet    LIPID PANEL    Basic Metabolic Panel   5. High risk medication use     6. NSTEMI (non-ST elevated myocardial infarction) (HCC)  metoprolol tartrate (LOPRESSOR) 25 MG Tab       Medical Decision Making:  Today's Assessment / Status / Plan:   CAD s/p PCI and stent of LAD:  Betablocker as Metoprolol will be kept at 25 mg po 2x daily.  Dual antiplatelet therapy with ASA 81 mg po daily and Presugrel 10mg 1x daily.  Will continue Atorvastatin 80 mg po daily  Will continue Lisinopril 10 mg po daily.     Hypertension:  Optimize control with BB, ACE-I as permitted.     Hyperlipidemia:  Optimize statin as within guidelines of CAD treatment as above.     I will see patient back in clinic with lab tests and studies results in 6 months.     I thank you Dr. Liu for referring patient to our Cardiology Clinic today.

## 2021-09-30 ENCOUNTER — TELEPHONE (OUTPATIENT)
Dept: HEALTH INFORMATION MANAGEMENT | Facility: OTHER | Age: 66
End: 2021-09-30

## 2022-01-21 ENCOUNTER — PATIENT MESSAGE (OUTPATIENT)
Dept: HEALTH INFORMATION MANAGEMENT | Facility: OTHER | Age: 67
End: 2022-01-21
Payer: MEDICARE

## 2022-04-21 ENCOUNTER — TELEPHONE (OUTPATIENT)
Dept: HEALTH INFORMATION MANAGEMENT | Facility: OTHER | Age: 67
End: 2022-04-21

## 2023-08-09 ENCOUNTER — TELEPHONE (OUTPATIENT)
Dept: HEALTH INFORMATION MANAGEMENT | Facility: OTHER | Age: 68
End: 2023-08-09